# Patient Record
Sex: FEMALE | Race: WHITE | HISPANIC OR LATINO | Employment: FULL TIME | URBAN - METROPOLITAN AREA
[De-identification: names, ages, dates, MRNs, and addresses within clinical notes are randomized per-mention and may not be internally consistent; named-entity substitution may affect disease eponyms.]

---

## 2023-07-05 LAB
EXTERNAL HIV SCREEN: NORMAL
HCV AB SER-ACNC: NORMAL

## 2023-07-06 LAB — EXTERNAL HEPATITIS B SURFACE ANTIGEN: NORMAL

## 2023-08-10 ENCOUNTER — TELEPHONE (OUTPATIENT)
Dept: GYNECOLOGY | Facility: CLINIC | Age: 38
End: 2023-08-10

## 2023-08-11 NOTE — TELEPHONE ENCOUNTER
Patient returned records release. Faxed records release to [address] [place] [address] at Fax Number [phone]. Faxed records release to Dr. Erma Martinez office at f [phone].

## 2023-08-11 NOTE — TELEPHONE ENCOUNTER
Placed call to patient, offered here 8/17 in the [facility]) office. Patient declined as her next OB appointment wouldn't be until the week of 9/5. Patient has off the following days: 28th and 31st. Sept 1, 5. Prefers [facility]) office, either early morning or latest appointment in the day. Patient has had her OB care through Dr. Sarbjit Farris, [place]. Patient saw IVF - Dr. Michele Forte, [place]. I emailed patient a medical records release requesting she fill out 2 release forms, one for each provider, and email/fax them back to our office for us to submit. Will review records and look for opening to schedule patient. Email: [email or web address] - sent patient an email to sign up for [email or web address].

## 2023-08-14 ENCOUNTER — INITIAL PRENATAL (OUTPATIENT)
Dept: OBGYN CLINIC | Facility: CLINIC | Age: 38
End: 2023-08-14
Payer: COMMERCIAL

## 2023-08-14 VITALS
HEIGHT: 64 IN | SYSTOLIC BLOOD PRESSURE: 104 MMHG | DIASTOLIC BLOOD PRESSURE: 62 MMHG | BODY MASS INDEX: 26.98 KG/M2 | WEIGHT: 158 LBS

## 2023-08-14 DIAGNOSIS — Z34.92 SECOND TRIMESTER PREGNANCY: Primary | ICD-10-CM

## 2023-08-14 DIAGNOSIS — Z78.9 CONCEIVED BY IN VITRO FERTILIZATION: ICD-10-CM

## 2023-08-14 LAB — EXTERNAL RUBELLA IGG QUANTITATION: NORMAL

## 2023-08-14 PROCEDURE — PNV: Performed by: PHYSICIAN ASSISTANT

## 2023-08-14 PROCEDURE — 76815 OB US LIMITED FETUS(S): CPT | Performed by: PHYSICIAN ASSISTANT

## 2023-08-14 RX ORDER — LEVOTHYROXINE SODIUM 75 UG/1
CAPSULE ORAL
COMMUNITY

## 2023-08-14 NOTE — TELEPHONE ENCOUNTER
Placed call to patient as there was a cancellation this morning and patient was off today - patient is able to make the appointment. Scheduled patient for NOB for 16 weeks with Michael Ren.

## 2023-08-14 NOTE — PROGRESS NOTES
Pt as a NOB transfer to our office. She conceived via IVF and had initial OB care at a practice in [place]. She just moved to our area. She does not have any records with her today. She notes that she PE and labs done. Will plan to hold of on ordering all intial labs/cultures until results are obtained as that process has been initiated. Pt had biopsy prior to ET as well as NIPS. STD: h/o chlamydia previously  Drug use: denies   MRSA: denies   Varicella: thinks she had infection  Ob hx:  1  at term, VIP x 2 prior to that pregnancy. Will plan to establish with [facility] for 20 week anatomy scan. US in office today:   TAUS CRL c/w 17w3d. FL c/w 15w5d +  bpm. [place] IUP. + FM noted. Slip written for AFP.    Urine neg/neg

## 2023-08-14 NOTE — TELEPHONE ENCOUNTER
Dr Felicia Singh office at [phone], no answer, tried office twice. Placed call to [facility] [phone] - request marked urgent to send records. Left a message for [address] a return call with patients last PAP info and if prenatal labs were completed. Requested records be faxed ASAP. Faxed records request to [phone] for [facility] office (alternate fax number for outside [address] [place] [address]).

## 2023-08-15 ENCOUNTER — TELEPHONE (OUTPATIENT)
Facility: HOSPITAL | Age: 38
End: 2023-08-15

## 2023-08-15 LAB
# FETUSES US: 1
AFP ADJ MOM SERPL: 1.89
AFP INTERP SERPL-IMP: NORMAL
AFP SERPL-MCNC: 54.5 NG/ML
AGE: NORMAL
DONATED EGG PATIENT QL: NO
GA CLIN EST: 15.7 WEEKS
GA METHOD: NORMAL
HX OF NTD NARR: NO
HX OF TRISOMY 21 NARR: NO
IDDM PATIENT QL: NO
NEURAL TUBE DEFECT RISK FETUS: NORMAL %
SL AMB REPEAT SPECIMEN: NO

## 2023-08-15 NOTE — TELEPHONE ENCOUNTER
Called patient to schedule MFM appointment, based on referral issued to Maternal Fetal Medicine by [facility] office. Left voicemail requesting patient to call back and schedule appointment, with office number for return call [phone].

## 2023-08-18 ENCOUNTER — TELEPHONE (OUTPATIENT)
Facility: HOSPITAL | Age: 38
End: 2023-08-18

## 2023-08-18 NOTE — TELEPHONE ENCOUNTER
Called patient to schedule MFM appointment, based on referral issued to Maternal Fetal Medicine by [facility] office. Left voicemail requesting patient to call back and schedule appointment, with office number for return call [phone].

## 2023-09-12 NOTE — PROGRESS NOTES
OB/GYN  PN Visit  Sánchez Ramirez  [id number]  2023  12:20 PM  Dr. Kina Witt MD    S: 43 y.o. [id number] 20w1d here for PN visit. She denies contractions. She denies2 leakage of fluid and vaginal bleeding. She reports good fetal movement. She denies nausea, vomiting, edema, domestic violence, and smoking. She has headaches. Her pregnancy is complicated by IVF pregnancy, hypothyroidism, and AMA. She reports feeling pain in her ribs and upper abdomen especially when driving/ sitting. She states that it is most pronounced during her 2 hour commutes to work in [address] [place] [address] (they recently moved to [place]). She requests a note for work to maybe work from home (given). She also reports pelvic cramping after sitting for long periods of time. She also reports heartburn. O:  Pre-Jennifer Vitals    Flowsheet Row Most Recent Value   Prenatal Assessment    Fetal Heart Rate 154   Fundal Height (cm) 22 cm   Movement Present   Prenatal Vitals    Blood Pressure 110/72   Weight - Scale 73.9 kg (163 lb)   Urine Albumin/Glucose    Dilation/Effacement/Station    Vaginal Drainage    Draining Fluid No   Edema    LLE Edema None   RLE Edema None        Physical Exam  Vitals reviewed. Constitutional:       General: She is not in acute distress. Appearance: Normal appearance. She is well-developed. She is not ill-appearing, toxic-appearing or diaphoretic. Cardiovascular:      Rate and Rhythm: Normal rate. Pulmonary:      Effort: Pulmonary effort is normal. No respiratory distress. Abdominal:      General: There is no distension. Palpations: Abdomen is soft. There is no mass. Tenderness: There is no abdominal tenderness. There is no guarding or rebound. Genitourinary:     Comments: Gravid, nontender  Skin:     General: Skin is warm and dry. Neurological:      Mental Status: She is alert and oriented to person, place, and time.    Psychiatric:         Mood and Affect: Mood normal.         Behavior: Behavior normal. A/P:    Problem List        Unprioritized    19 weeks gestation of pregnancy    Current Assessment & Plan     - Continue PNV  - Labs: UTD  - Genetics: NIPT wnl; MSAPF wnl  - Ultrasounds: Level II Scheduled for tomorrow (9/15/23)  - Tdap:  Will offer after 27 weeks gestation  - Flu Shot: Will offer in season  - COVID: Vaccinated  - Delivery:  with epidural  - Contraception: Declined despite counseling  - Breastfeeding: Yes  - RTO in 4 weeks           Hypothyroid in pregnancy, antepartum, second trimester    Overview     Continue Levothyroxine  Referral to Endocrinology given per patient request         Multigravida of advanced maternal age in second trimester    Overview     Normal NIPT        Other Visit Diagnoses     Heartburn during pregnancy in second trimester    -  Primary            Future Appointments   Date Time Provider [facility] [address]   9/15/2023  8:00 AM  [facility]   10/10/2023  7:30 AM JOANNA Rosa [facility]   2023  8:30 AM JOANNA Rosa [facility]   2023  8:00 AM Unknown CHIVO Johnson [facility]   2023  7:30 AM JOANNA Peterson [facility]   2023 12:30 PM Jackson Juarez MD [facility]   2023  9:30 AM Carolyn Elaine, [address], [place]  2023  12:20 PM

## 2023-09-14 ENCOUNTER — ROUTINE PRENATAL (OUTPATIENT)
Dept: OBGYN CLINIC | Facility: CLINIC | Age: 38
End: 2023-09-14

## 2023-09-14 VITALS — WEIGHT: 163 LBS | DIASTOLIC BLOOD PRESSURE: 72 MMHG | SYSTOLIC BLOOD PRESSURE: 110 MMHG | BODY MASS INDEX: 27.98 KG/M2

## 2023-09-14 DIAGNOSIS — O26.892 HEARTBURN DURING PREGNANCY IN SECOND TRIMESTER: Primary | ICD-10-CM

## 2023-09-14 DIAGNOSIS — O99.282 HYPOTHYROID IN PREGNANCY, ANTEPARTUM, SECOND TRIMESTER: ICD-10-CM

## 2023-09-14 DIAGNOSIS — E03.9 HYPOTHYROID IN PREGNANCY, ANTEPARTUM, SECOND TRIMESTER: ICD-10-CM

## 2023-09-14 DIAGNOSIS — R12 HEARTBURN DURING PREGNANCY IN SECOND TRIMESTER: Primary | ICD-10-CM

## 2023-09-14 DIAGNOSIS — Z3A.19 19 WEEKS GESTATION OF PREGNANCY: ICD-10-CM

## 2023-09-14 PROBLEM — O09.522 MULTIGRAVIDA OF ADVANCED MATERNAL AGE IN SECOND TRIMESTER: Status: ACTIVE | Noted: 2023-09-14

## 2023-09-14 PROCEDURE — PNV: Performed by: OBSTETRICS & GYNECOLOGY

## 2023-09-14 RX ORDER — OMEPRAZOLE 20 MG/1
20 CAPSULE, DELAYED RELEASE ORAL DAILY
Qty: 30 CAPSULE | Refills: 3 | Status: SHIPPED | OUTPATIENT
Start: 2023-09-14

## 2023-09-14 NOTE — LETTER
September 14, 2023     Patient: Conrad Dsouza  YOB: 1985  Date of Visit: 9/14/2023      To Whom it May Concern:    Conrad Dsouza is under my professional care. Christian Garcia was seen in my office on 9/14/2023. Due to her pregnancy and related discomfort, attempts should be made to minimize commute/driving time. If you have any questions or concerns, please don't hesitate to call.          Sincerely,          Pato Turner MD

## 2023-09-14 NOTE — ASSESSMENT & PLAN NOTE
- Continue PNV  - Labs: UTD  - Genetics: NIPT wnl; MSAPF wnl  - Ultrasounds: Level II Scheduled for tomorrow (9/15/23)  - Tdap:  Will offer after 27 weeks gestation  - Flu Shot: Will offer in season  - COVID: Vaccinated  - Delivery:  with epidural  - Contraception: Declined despite counseling  - Breastfeeding: Yes  - RTO in 4 weeks

## 2023-09-15 ENCOUNTER — TELEPHONE (OUTPATIENT)
Dept: ENDOCRINOLOGY | Facility: CLINIC | Age: 38
End: 2023-09-15

## 2023-09-15 ENCOUNTER — ROUTINE PRENATAL (OUTPATIENT)
Dept: PERINATAL CARE | Facility: CLINIC | Age: 38
End: 2023-09-15
Payer: COMMERCIAL

## 2023-09-15 VITALS
HEIGHT: 64 IN | SYSTOLIC BLOOD PRESSURE: 98 MMHG | BODY MASS INDEX: 27.69 KG/M2 | HEART RATE: 70 BPM | WEIGHT: 162.2 LBS | DIASTOLIC BLOOD PRESSURE: 62 MMHG

## 2023-09-15 DIAGNOSIS — O09.522 MULTIGRAVIDA OF ADVANCED MATERNAL AGE IN SECOND TRIMESTER: ICD-10-CM

## 2023-09-15 DIAGNOSIS — D25.9 UTERINE FIBROID IN PREGNANCY: ICD-10-CM

## 2023-09-15 DIAGNOSIS — Z36.3 ENCOUNTER FOR ANTENATAL SCREENING FOR MALFORMATION: ICD-10-CM

## 2023-09-15 DIAGNOSIS — O99.282 HYPOTHYROID IN PREGNANCY, ANTEPARTUM, SECOND TRIMESTER: ICD-10-CM

## 2023-09-15 DIAGNOSIS — Z34.92 SECOND TRIMESTER PREGNANCY: ICD-10-CM

## 2023-09-15 DIAGNOSIS — Z36.86 ENCOUNTER FOR ANTENATAL SCREENING FOR CERVICAL LENGTH: ICD-10-CM

## 2023-09-15 DIAGNOSIS — Z3A.20 20 WEEKS GESTATION OF PREGNANCY: Primary | ICD-10-CM

## 2023-09-15 DIAGNOSIS — Z78.9 CONCEIVED BY IN VITRO FERTILIZATION: ICD-10-CM

## 2023-09-15 DIAGNOSIS — E03.9 HYPOTHYROID IN PREGNANCY, ANTEPARTUM, SECOND TRIMESTER: ICD-10-CM

## 2023-09-15 DIAGNOSIS — O34.10 UTERINE FIBROID IN PREGNANCY: ICD-10-CM

## 2023-09-15 PROCEDURE — 76817 TRANSVAGINAL US OBSTETRIC: CPT | Performed by: STUDENT IN AN ORGANIZED HEALTH CARE EDUCATION/TRAINING PROGRAM

## 2023-09-15 PROCEDURE — 76811 OB US DETAILED SNGL FETUS: CPT | Performed by: STUDENT IN AN ORGANIZED HEALTH CARE EDUCATION/TRAINING PROGRAM

## 2023-09-15 RX ORDER — ASPIRIN 81 MG/1
162 TABLET, CHEWABLE ORAL DAILY
Qty: 180 TABLET | Refills: 1 | Status: SHIPPED | OUTPATIENT
Start: 2023-09-15

## 2023-09-15 NOTE — LETTER
September 15, 2023     Cristofer Reyes PA-C  [facility] [place] [address]    Patient: Amadeo Severance   YOB: 1985   Date of Visit: 9/15/2023       Dear Dr. Laury Leary: Thank you for referring Amadeo Severance to me for evaluation. Below are my notes for this consultation. If you have questions, please do not hesitate to call me. I look forward to following your patient along with you. Sincerely,        Toña Rodas MD        CC: No Recipients    Toña Rodas MD  9/15/2023  9:09 AM  Sign when Signing Visit  [id number]  [facility]: Ms. Brayan Beyer was seen today for anatomic survey and cervical length screening ultrasound. See ultrasound report under "OB Procedures" tab. Physical Exam  Constitutional:       General: She is not in acute distress. Appearance: Normal appearance. HENT:      Head: Normocephalic and atraumatic. Eyes:      Extraocular Movements: Extraocular movements intact. Cardiovascular:      Rate and Rhythm: Normal rate. Pulmonary:      Effort: Pulmonary effort is normal. No respiratory distress. Skin:     Findings: No erythema or rash. Neurological:      Mental Status: She is alert and oriented to person, place, and time. Psychiatric:         Mood and Affect: Mood normal.         Behavior: Behavior normal.         Please don't hesitate to contact our office with any concerns or questions.   -Toña Rodas MD

## 2023-09-15 NOTE — LETTER
September 15, 2023     Patient: Rosa Elena Foss  YOB: 1985  Date of Visit: 9/15/2023      To Whom it May Concern:    Rosa Elena Foss is under my professional care. Raymundo greer was seen in my office on 9/15/2023. [place] may return to work on 9/18/23 . If you have any questions or concerns, please don't hesitate to call.          Sincerely,          Nikki Rich MD        CC: No Recipients

## 2023-09-15 NOTE — PROGRESS NOTES
[id number]  [facility]: Ms. Carla Banks was seen today for anatomic survey and cervical length screening ultrasound. See ultrasound report under "OB Procedures" tab. Physical Exam  Constitutional:       General: She is not in acute distress. Appearance: Normal appearance. HENT:      Head: Normocephalic and atraumatic. Eyes:      Extraocular Movements: Extraocular movements intact. Cardiovascular:      Rate and Rhythm: Normal rate. Pulmonary:      Effort: Pulmonary effort is normal. No respiratory distress. Skin:     Findings: No erythema or rash. Neurological:      Mental Status: She is alert and oriented to person, place, and time. Psychiatric:         Mood and Affect: Mood normal.         Behavior: Behavior normal.         Please don't hesitate to contact our office with any concerns or questions.   -Leela Huang MD

## 2023-09-15 NOTE — PROGRESS NOTES
Ultrasound Probe Disinfection    A transvaginal ultrasound was performed. Prior to use, disinfection was performed with High Level Disinfection Process (Behalfon). Probe serial number [id number]: [id number] was used.       Pepe Ruffin  09/15/23  8:19 AM

## 2023-09-28 DIAGNOSIS — R12 HEARTBURN DURING PREGNANCY IN SECOND TRIMESTER: ICD-10-CM

## 2023-09-28 DIAGNOSIS — O26.892 HEARTBURN DURING PREGNANCY IN SECOND TRIMESTER: ICD-10-CM

## 2023-09-28 RX ORDER — OMEPRAZOLE 20 MG/1
20 CAPSULE, DELAYED RELEASE ORAL DAILY
Qty: 90 CAPSULE | Refills: 2 | Status: SHIPPED | OUTPATIENT
Start: 2023-09-28

## 2023-09-30 NOTE — PROGRESS NOTES
Please refer to the [facility] ultrasound report in Ob Procedures for additional information regarding today's visit

## 2023-10-02 ENCOUNTER — ROUTINE PRENATAL (OUTPATIENT)
Dept: PERINATAL CARE | Facility: OTHER | Age: 38
End: 2023-10-02
Payer: COMMERCIAL

## 2023-10-02 VITALS
WEIGHT: 162.8 LBS | HEART RATE: 96 BPM | SYSTOLIC BLOOD PRESSURE: 90 MMHG | BODY MASS INDEX: 27.79 KG/M2 | DIASTOLIC BLOOD PRESSURE: 60 MMHG | HEIGHT: 64 IN

## 2023-10-02 DIAGNOSIS — Z3A.22 22 WEEKS GESTATION OF PREGNANCY: Primary | ICD-10-CM

## 2023-10-02 DIAGNOSIS — O09.812 PREGNANCY RESULTING FROM IN VITRO FERTILIZATION IN SECOND TRIMESTER: ICD-10-CM

## 2023-10-02 PROCEDURE — 76827 ECHO EXAM OF FETAL HEART: CPT | Performed by: OBSTETRICS & GYNECOLOGY

## 2023-10-02 PROCEDURE — 76825 ECHO EXAM OF FETAL HEART: CPT | Performed by: OBSTETRICS & GYNECOLOGY

## 2023-10-02 PROCEDURE — 93325 DOPPLER ECHO COLOR FLOW MAPG: CPT | Performed by: OBSTETRICS & GYNECOLOGY

## 2023-10-02 NOTE — LETTER
October 2, 2023     Kina Witt, [address] [facility]  [address] [facility]   [address] [place] [id number]    Patient: Sánchez Ramirez   YOB: 1985   Date of Visit: 10/2/2023       Dear Dr. Shahbaz Mclain: Thank you for referring Sánchez Ramirez to me for evaluation. Below are my notes for this consultation. If you have questions, please do not hesitate to call me. I look forward to following your patient along with you.          Sincerely,        Kelvin Lofton MD        CC: No Recipients    Kelvin Lofton MD  9/30/2023  6:32 PM  Sign when Signing Visit  Please refer to the [facility] ultrasound report in Ob Procedures for additional information regarding today's visit

## 2023-10-06 NOTE — PROGRESS NOTES
OB/GYN  PN Visit  Terence Montemayor  [id number]  10/10/2023  8:06 AM  Sarahe BenceJOANNA    S: 43 y.o. [id number] 23w6d here for PN visit. Pregnancy complicated by IVF pregnancy, hypothyroid, uterine fibroids, and AMA. Weekly NST to start at 32 weeks. OB complaints:  Denies c/o n/v/ha, no edema, no smoking, no DV. No vb/lof  No cramping/ctxns or signs of PTL. She feels fetal movement but believes his pattern has changed to being most active at night. She is questioning if this is 'okay'. She reports positive fetal movement today. She also reports heavy white vaginal discharge and itching. O:    Pre-Jennifer Vitals    Flowsheet Row Most Recent Value   Prenatal Assessment    Fetal Heart Rate 155   Fundal Height (cm) 24 cm   Movement Present   Prenatal Vitals    Blood Pressure 96/62   Weight - Scale 76.1 kg (167 lb 12.8 oz)   Urine Albumin/Glucose    Dilation/Effacement/Station    Vaginal Drainage    Draining Fluid No   Edema         Physical Exam  Genitourinary:     Exam position: Lithotomy position. Pubic Area: Rash present. Labia:         Right: No rash or tenderness. Left: No rash or tenderness. Urethra: No urethral pain. Vagina: Vaginal discharge present. Cervix: Normal.          Comments: Mild erythema noted on clitoral mendoza. +thick, white vaginal discharge adhered to vaginal walls, evident of candida infection. No active pooling of fluid. Gen: no acute distress, nonlabored breathing. OB exam completed: fundal height, +FHT. +visible active movement. Urine: -/-    A/P:  1. 23w6d GESTATION  2.Labor precautions reviewed  3. Third Tri labs given to be completed between 26-28 weeks. TSH/T4 ordered to evaluate thyroid disorder. 4. Monistat 7 day ordered for clinically evident candida infection. 5. Genital culture collected to r/o other infections. 6. Encouraged use of organic coconut oil to external vulva for irritation and itching.    7. Reviewed with patient that babies can start developing a 'normal' pattern of movement, but if ever questioning if movement is decreased, to call the office for evaluation. Active, visible movement noted at today's visit.        RTC in 4 weeks    JOANNA Estrada  10/10/2023  8:06 AM

## 2023-10-10 ENCOUNTER — ROUTINE PRENATAL (OUTPATIENT)
Dept: OBGYN CLINIC | Facility: CLINIC | Age: 38
End: 2023-10-10

## 2023-10-10 VITALS
BODY MASS INDEX: 28.65 KG/M2 | WEIGHT: 167.8 LBS | HEIGHT: 64 IN | DIASTOLIC BLOOD PRESSURE: 62 MMHG | SYSTOLIC BLOOD PRESSURE: 96 MMHG

## 2023-10-10 DIAGNOSIS — Z3A.23 23 WEEKS GESTATION OF PREGNANCY: Primary | ICD-10-CM

## 2023-10-10 DIAGNOSIS — N89.8 VAGINAL DISCHARGE: ICD-10-CM

## 2023-10-10 DIAGNOSIS — D25.9 UTERINE FIBROID IN PREGNANCY: ICD-10-CM

## 2023-10-10 DIAGNOSIS — O99.282 HYPOTHYROID IN PREGNANCY, ANTEPARTUM, SECOND TRIMESTER: ICD-10-CM

## 2023-10-10 DIAGNOSIS — O09.522 MULTIGRAVIDA OF ADVANCED MATERNAL AGE IN SECOND TRIMESTER: ICD-10-CM

## 2023-10-10 DIAGNOSIS — E03.9 HYPOTHYROID IN PREGNANCY, ANTEPARTUM, SECOND TRIMESTER: ICD-10-CM

## 2023-10-10 DIAGNOSIS — O34.10 UTERINE FIBROID IN PREGNANCY: ICD-10-CM

## 2023-10-10 DIAGNOSIS — Z78.9 CONCEIVED BY IN VITRO FERTILIZATION: ICD-10-CM

## 2023-10-10 PROCEDURE — PNV

## 2023-10-10 RX ORDER — LEVOTHYROXINE SODIUM 0.1 MG/1
TABLET ORAL
COMMUNITY
Start: 2023-10-03

## 2023-10-10 NOTE — PATIENT INSTRUCTIONS
Monistat 7 day  Use organic coconut oil for external vulva  Third trimester labs to be completed between 26-28 weeks.

## 2023-10-25 ENCOUNTER — PATIENT MESSAGE (OUTPATIENT)
Dept: OBGYN CLINIC | Facility: CLINIC | Age: 38
End: 2023-10-25

## 2023-10-27 ENCOUNTER — APPOINTMENT (OUTPATIENT)
Dept: LAB | Facility: CLINIC | Age: 38
End: 2023-10-27
Payer: COMMERCIAL

## 2023-10-27 DIAGNOSIS — Z34.92 SECOND TRIMESTER PREGNANCY: ICD-10-CM

## 2023-10-27 DIAGNOSIS — Z3A.23 23 WEEKS GESTATION OF PREGNANCY: ICD-10-CM

## 2023-10-27 LAB
BASOPHILS # BLD AUTO: 0.03 THOUSANDS/ÂΜL (ref 0–0.1)
BASOPHILS NFR BLD AUTO: 0 % (ref 0–1)
EOSINOPHIL # BLD AUTO: 0.13 THOUSAND/ÂΜL (ref 0–0.61)
EOSINOPHIL NFR BLD AUTO: 2 % (ref 0–6)
ERYTHROCYTE [DISTWIDTH] IN BLOOD BY AUTOMATED COUNT: 12.9 % (ref 11.6–15.1)
GLUCOSE 1H P 50 G GLC PO SERPL-MCNC: 112 MG/DL (ref 40–134)
HCT VFR BLD AUTO: 36.9 % (ref 34.8–46.1)
HGB BLD-MCNC: 12.2 G/DL (ref 11.5–15.4)
IMM GRANULOCYTES # BLD AUTO: 0.11 THOUSAND/UL (ref 0–0.2)
IMM GRANULOCYTES NFR BLD AUTO: 1 % (ref 0–2)
LYMPHOCYTES # BLD AUTO: 1.61 THOUSANDS/ÂΜL (ref 0.6–4.47)
LYMPHOCYTES NFR BLD AUTO: 21 % (ref 14–44)
MCH RBC QN AUTO: 29.5 PG (ref 26.8–34.3)
MCHC RBC AUTO-ENTMCNC: 33.1 G/DL (ref 31.4–37.4)
MCV RBC AUTO: 89 FL (ref 82–98)
MONOCYTES # BLD AUTO: 0.6 THOUSAND/ÂΜL (ref 0.17–1.22)
MONOCYTES NFR BLD AUTO: 8 % (ref 4–12)
NEUTROPHILS # BLD AUTO: 5.33 THOUSANDS/ÂΜL (ref 1.85–7.62)
NEUTS SEG NFR BLD AUTO: 68 % (ref 43–75)
NRBC BLD AUTO-RTO: 0 /100 WBCS
PLATELET # BLD AUTO: 227 THOUSANDS/UL (ref 149–390)
PMV BLD AUTO: 10.2 FL (ref 8.9–12.7)
RBC # BLD AUTO: 4.14 MILLION/UL (ref 3.81–5.12)
TREPONEMA PALLIDUM IGG+IGM AB [PRESENCE] IN SERUM OR PLASMA BY IMMUNOASSAY: NORMAL
TSH SERPL DL<=0.05 MIU/L-ACNC: 0.76 UIU/ML (ref 0.45–4.5)
WBC # BLD AUTO: 7.81 THOUSAND/UL (ref 4.31–10.16)

## 2023-10-27 PROCEDURE — 82105 ALPHA-FETOPROTEIN SERUM: CPT

## 2023-10-27 PROCEDURE — 86780 TREPONEMA PALLIDUM: CPT

## 2023-10-27 PROCEDURE — 84443 ASSAY THYROID STIM HORMONE: CPT

## 2023-10-27 PROCEDURE — 82950 GLUCOSE TEST: CPT

## 2023-10-27 PROCEDURE — 36415 COLL VENOUS BLD VENIPUNCTURE: CPT

## 2023-10-27 PROCEDURE — 85025 COMPLETE CBC W/AUTO DIFF WBC: CPT

## 2023-10-31 LAB
2ND TRIMESTER 4 SCREEN SERPL-IMP: NORMAL
AFP ADJ MOM SERPL: NORMAL
AFP INTERP AMN-IMP: NORMAL
AFP INTERP SERPL-IMP: NORMAL
AFP INTERP SERPL-IMP: NORMAL
AFP SERPL-MCNC: 282.9 NG/ML
AGE AT DELIVERY: 39.4 YR
GA METHOD: NORMAL
GA: 26.3 WEEKS
IDDM PATIENT QL: NO
MULTIPLE PREGNANCY: NO
NEURAL TUBE DEFECT RISK FETUS: NORMAL %

## 2023-11-06 NOTE — PROGRESS NOTES
OB/GYN  PN Visit  Ranjana Redman  [id number]  2023  8:53 AM  JOANNA Moore    S: 43 y.o. [id number] 28w1d here for PN visit. Pregnancy complicated by IVF pregnancy, hypothyroid, uterine fibroids, and AMA. Weekly NST to start at 32 weeks. OB complaints:  Denies c/o n/v/ha, no edema, no smoking, no DV. No vb/lof  No cramping/ctxns or signs of PTL. She reports bad pressure at top of uterus. Denies vaginal bleeding. +orin jones; occasional.     O:    Pre- Vitals      Flowsheet Row Most Recent Value   Prenatal Assessment    Fetal Heart Rate 145   Fundal Height (cm) 28 cm   Movement Present   Prenatal Vitals    Blood Pressure 98/68   Weight - Scale 77.1 kg (170 lb)   Urine Albumin/Glucose    Dilation/Effacement/Station    Vaginal Drainage    Edema               Gen: no acute distress, nonlabored breathing. OB exam completed: fundal height, +FHT. Urine: -/-    A/P:  #1. 28w1d GESTATION  Labor precautions reviewed  Fetal kick counts reviewed  Tdap: at next visit. Flu: declines  Labs UTD. Breast pump: yes, pump ordered today. Pediatrician: +  Contraception: undecided.        RTC in 2 weeks    JOANNA Moore  2023  8:53 AM

## 2023-11-09 ENCOUNTER — ROUTINE PRENATAL (OUTPATIENT)
Dept: OBGYN CLINIC | Facility: CLINIC | Age: 38
End: 2023-11-09

## 2023-11-09 VITALS — DIASTOLIC BLOOD PRESSURE: 68 MMHG | SYSTOLIC BLOOD PRESSURE: 98 MMHG | BODY MASS INDEX: 29.18 KG/M2 | WEIGHT: 170 LBS

## 2023-11-09 DIAGNOSIS — Z3A.28 28 WEEKS GESTATION OF PREGNANCY: Primary | ICD-10-CM

## 2023-11-09 DIAGNOSIS — O09.522 MULTIGRAVIDA OF ADVANCED MATERNAL AGE IN SECOND TRIMESTER: ICD-10-CM

## 2023-11-09 DIAGNOSIS — D25.9 UTERINE FIBROID IN PREGNANCY: ICD-10-CM

## 2023-11-09 DIAGNOSIS — Z78.9 CONCEIVED BY IN VITRO FERTILIZATION: ICD-10-CM

## 2023-11-09 DIAGNOSIS — O99.282 HYPOTHYROID IN PREGNANCY, ANTEPARTUM, SECOND TRIMESTER: ICD-10-CM

## 2023-11-09 DIAGNOSIS — O34.10 UTERINE FIBROID IN PREGNANCY: ICD-10-CM

## 2023-11-09 DIAGNOSIS — E03.9 HYPOTHYROID IN PREGNANCY, ANTEPARTUM, SECOND TRIMESTER: ICD-10-CM

## 2023-11-09 LAB
DME PARACHUTE DELIVERY DATE REQUESTED: NORMAL
DME PARACHUTE ITEM DESCRIPTION: NORMAL
DME PARACHUTE ORDER STATUS: NORMAL
DME PARACHUTE SUPPLIER NAME: NORMAL
DME PARACHUTE SUPPLIER PHONE: NORMAL

## 2023-11-09 PROCEDURE — PNV

## 2023-11-16 NOTE — PROGRESS NOTES
VISIT: Denies n/v/HA/cramping/vb/lof/edema/dv/smoking; urine neg/neg  (+) epigastric pain since August - comes and goes - can be sharp - uncomfortable when sitting - reviewed possibility of slow GI tract - food sitting in stomach and/or constipation; h/o of uterine fibroid; Encouraged trial of papaya tablets and/or prilosec. Bowels are regular enough - for the most part q day  Labs up to date; [facility] - follow up growth at 32 wks and start weekly APFS at 32 weeks; rec LDASA 162 mg daily until 36w  PNVs + DHA - tolerating daily  Good FM - r/meagan 10 kicks/2 hrs  Tdap - reviewed and encouraged - still considering - encouraged prior to 36 weeks and earlier in the third trimester;     Breast pump - ordered last visit; Peds - established; Contraception - undecided;  Yellow folder given and reviewed; Delivery Care Consent reviewed and signed   Encouraged hydration.  Reviewed signs and symptoms of labor and preE and when to call  Encouraged the flu vaccine and COVID booster in pregnancy - declines flu/COVID vaccine  RTO in 2 weeks for routine ob check or sooner if needed

## 2023-11-20 ENCOUNTER — ROUTINE PRENATAL (OUTPATIENT)
Dept: OBGYN CLINIC | Facility: CLINIC | Age: 38
End: 2023-11-20

## 2023-11-20 VITALS
DIASTOLIC BLOOD PRESSURE: 74 MMHG | BODY MASS INDEX: 29.53 KG/M2 | SYSTOLIC BLOOD PRESSURE: 102 MMHG | WEIGHT: 173 LBS | HEIGHT: 64 IN

## 2023-11-20 DIAGNOSIS — O99.283 HYPOTHYROID IN PREGNANCY, ANTEPARTUM, THIRD TRIMESTER: ICD-10-CM

## 2023-11-20 DIAGNOSIS — D25.9 UTERINE FIBROID IN PREGNANCY: ICD-10-CM

## 2023-11-20 DIAGNOSIS — Z3A.29 29 WEEKS GESTATION OF PREGNANCY: Primary | ICD-10-CM

## 2023-11-20 DIAGNOSIS — E03.9 HYPOTHYROID IN PREGNANCY, ANTEPARTUM, THIRD TRIMESTER: ICD-10-CM

## 2023-11-20 DIAGNOSIS — O34.10 UTERINE FIBROID IN PREGNANCY: ICD-10-CM

## 2023-11-20 DIAGNOSIS — O09.523 MULTIGRAVIDA OF ADVANCED MATERNAL AGE IN THIRD TRIMESTER: ICD-10-CM

## 2023-11-20 PROCEDURE — PNV: Performed by: PHYSICIAN ASSISTANT

## 2023-11-20 NOTE — PROGRESS NOTES
Please refer to the [facility] ultrasound report in Ob Procedures for additional information regarding today's visit

## 2023-11-22 ENCOUNTER — ULTRASOUND (OUTPATIENT)
Facility: HOSPITAL | Age: 38
End: 2023-11-22
Payer: COMMERCIAL

## 2023-11-22 VITALS
DIASTOLIC BLOOD PRESSURE: 54 MMHG | HEART RATE: 89 BPM | HEIGHT: 64 IN | OXYGEN SATURATION: 98 % | WEIGHT: 171.4 LBS | SYSTOLIC BLOOD PRESSURE: 102 MMHG | BODY MASS INDEX: 29.26 KG/M2

## 2023-11-22 DIAGNOSIS — Z3A.30 30 WEEKS GESTATION OF PREGNANCY: ICD-10-CM

## 2023-11-22 DIAGNOSIS — O09.813 PREGNANCY RESULTING FROM IN VITRO FERTILIZATION, THIRD TRIMESTER: Primary | ICD-10-CM

## 2023-11-22 DIAGNOSIS — E03.9 HYPOTHYROID IN PREGNANCY, ANTEPARTUM, THIRD TRIMESTER: ICD-10-CM

## 2023-11-22 DIAGNOSIS — O09.523 MULTIGRAVIDA OF ADVANCED MATERNAL AGE IN THIRD TRIMESTER: ICD-10-CM

## 2023-11-22 DIAGNOSIS — O99.283 HYPOTHYROID IN PREGNANCY, ANTEPARTUM, THIRD TRIMESTER: ICD-10-CM

## 2023-11-22 PROCEDURE — 76816 OB US FOLLOW-UP PER FETUS: CPT | Performed by: OBSTETRICS & GYNECOLOGY

## 2023-11-22 PROCEDURE — 99214 OFFICE O/P EST MOD 30 MIN: CPT | Performed by: OBSTETRICS & GYNECOLOGY

## 2023-11-22 NOTE — PATIENT INSTRUCTIONS
Kick Counts in Pregnancy   WHAT YOU NEED TO KNOW:   Kick counts measure how much your baby is moving in your womb. A kick from your baby can be felt as a twist, turn, swish, roll, or jab. It is common to feel your baby kicking at 26 to 28 weeks of pregnancy. You may feel your baby kick as early as 20 weeks of pregnancy. You may want to start counting at 28 weeks. DISCHARGE INSTRUCTIONS:   Contact your doctor immediately if:   You feel a change in the number of kicks or movements of your baby. You feel fewer than 10 kicks within 2 hours. You have questions or concerns about your baby's movements. Why measure kick counts:  Your baby's movement may provide information about your baby's health. He or she may move less, or not at all, if there are problems. Your baby may move less if he or she is not getting enough oxygen or nutrition from the placenta. Do not smoke while you are pregnant. Smoking decreases the amount of oxygen that gets to your baby. Talk to your healthcare provider if you need help to quit smoking. Tell your healthcare provider as soon as you feel a change in your baby's movements. When to measure kick counts:   Measure kick counts at the same time every day. Measure kick counts when your baby is awake and most active. Your baby may be most active in the evening. How to measure kick counts:  Check that your baby is awake before you measure kick counts. You can wake up your baby by lightly pushing on your belly, walking, or drinking something cold. Your healthcare provider may tell you different ways to measure kick counts. You may be told to do the following:  Use a chart or clock to keep track of the time you start and finish counting. Sit in a chair or lie on your left side. Place your hands on the largest part of your belly. Count until you reach 10 kicks. Write down how much time it takes to count 10 kicks. It may take 30 minutes to 2 hours to count 10 kicks. It should not take more than 2 hours to count 10 kicks. Follow up with your doctor as directed:  Write down your questions so you remember to ask them during your visits. © Copyright [facility] 2023 Information is for End User's use only and may not be sold, redistributed or otherwise used for commercial purposes. The above information is an  only. It is not intended as medical advice for individual conditions or treatments. Talk to your doctor, nurse or pharmacist before following any medical regimen to see if it is safe and effective for you.

## 2023-11-22 NOTE — LETTER
November 22, 2023     Becky Fernandes, [address]    Patient: Kulwant Pereira   YOB: 1985   Date of Visit: 11/22/2023       Dear Dr. Quang Gustafson: Thank you for referring Kulwant Pereira to me for evaluation. Below are my notes for this consultation. If you have questions, please do not hesitate to call me. I look forward to following your patient along with you.          Sincerely,        Henrique Eller MD        CC: No Recipients    Henrique Eller MD  11/22/2023  1:20 PM  Sign when Signing Visit  Please refer to the [facility] ultrasound report in Ob Procedures for additional information regarding today's visit

## 2023-12-07 NOTE — PROGRESS NOTES
OB/GYN  PN Visit  Mckenna Blackwood  [id number]  2023  2:00 PM  JOANNA uBstos    S: 43 y.o. [id number] 32w2d here for PN visit. Pregnancy complicated by IVF pregnancy, hypothyroid, uterine fibroids, and AMA. OB complaints:  Denies c/o n/v/ha, no edema, no smoking, no DV. No vb/lof  No cramping/ctxns or signs of PTL. She reports tender upper abdominal pain (top, middle below xiphoid). She denies that it heartburn. She reports that at her growth scan, they scanned the upper abdomen and did NOT see anything GYN related including placenta/fetal/uterine concerns (fibroids). She feels that the pain is musculoskeletal.     O:    Pre-Jennifer Vitals      Flowsheet Row Most Recent Value   Prenatal Assessment    Fetal Heart Rate 145   Fundal Height (cm) 32 cm   Movement Present   Prenatal Vitals    Blood Pressure 108/52   Weight - Scale 78.6 kg (173 lb 3.2 oz)   Urine Albumin/Glucose    Dilation/Effacement/Station    Vaginal Drainage    Edema               Gen: no acute distress, nonlabored breathing. OB exam completed: fundal height, +FHT. Urine: -/-    A/P:  #1. 32w2d GESTATION  Labor precautions reviewed  Fetal kick counts reviewed  Tdap: declines  Flu: declines  Labs UTD. Yellow packet and delivery consent done at 30 week visit. Breast pump: yes, pump ordered today. Pediatrician: +  Contraception: undecided. Advised to try belly band and/or pregnancy tape to support belly. Encouraged patient to reach out to PCP to review upper abdominal pain, if US is required. If pain is severe, to report ED.      RTC in 2 weeks    JOANNA Bustos  2023  2:00 PM

## 2023-12-08 ENCOUNTER — ROUTINE PRENATAL (OUTPATIENT)
Dept: OBGYN CLINIC | Facility: CLINIC | Age: 38
End: 2023-12-08

## 2023-12-08 VITALS
BODY MASS INDEX: 29.57 KG/M2 | DIASTOLIC BLOOD PRESSURE: 52 MMHG | SYSTOLIC BLOOD PRESSURE: 108 MMHG | WEIGHT: 173.2 LBS | HEIGHT: 64 IN

## 2023-12-08 DIAGNOSIS — O34.10 UTERINE FIBROID IN PREGNANCY: ICD-10-CM

## 2023-12-08 DIAGNOSIS — O99.283 HYPOTHYROID IN PREGNANCY, ANTEPARTUM, THIRD TRIMESTER: ICD-10-CM

## 2023-12-08 DIAGNOSIS — D25.9 UTERINE FIBROID IN PREGNANCY: ICD-10-CM

## 2023-12-08 DIAGNOSIS — Z3A.32 32 WEEKS GESTATION OF PREGNANCY: Primary | ICD-10-CM

## 2023-12-08 DIAGNOSIS — Z78.9 CONCEIVED BY IN VITRO FERTILIZATION: ICD-10-CM

## 2023-12-08 DIAGNOSIS — O09.523 MULTIGRAVIDA OF ADVANCED MATERNAL AGE IN THIRD TRIMESTER: ICD-10-CM

## 2023-12-08 DIAGNOSIS — E03.9 HYPOTHYROID IN PREGNANCY, ANTEPARTUM, THIRD TRIMESTER: ICD-10-CM

## 2023-12-08 PROCEDURE — PNV

## 2023-12-14 LAB
DME PARACHUTE DELIVERY DATE ACTUAL: NORMAL
DME PARACHUTE DELIVERY DATE REQUESTED: NORMAL
DME PARACHUTE ITEM DESCRIPTION: NORMAL
DME PARACHUTE ORDER STATUS: NORMAL
DME PARACHUTE SUPPLIER NAME: NORMAL
DME PARACHUTE SUPPLIER PHONE: NORMAL

## 2023-12-18 ENCOUNTER — ROUTINE PRENATAL (OUTPATIENT)
Dept: OBGYN CLINIC | Facility: CLINIC | Age: 38
End: 2023-12-18

## 2023-12-18 VITALS
HEIGHT: 64 IN | WEIGHT: 176 LBS | SYSTOLIC BLOOD PRESSURE: 104 MMHG | DIASTOLIC BLOOD PRESSURE: 70 MMHG | BODY MASS INDEX: 30.05 KG/M2

## 2023-12-18 DIAGNOSIS — O99.283 HYPOTHYROID IN PREGNANCY, ANTEPARTUM, THIRD TRIMESTER: ICD-10-CM

## 2023-12-18 DIAGNOSIS — Z3A.33 33 WEEKS GESTATION OF PREGNANCY: ICD-10-CM

## 2023-12-18 DIAGNOSIS — O09.523 MULTIGRAVIDA OF ADVANCED MATERNAL AGE IN THIRD TRIMESTER: Primary | ICD-10-CM

## 2023-12-18 DIAGNOSIS — E03.9 HYPOTHYROID IN PREGNANCY, ANTEPARTUM, THIRD TRIMESTER: ICD-10-CM

## 2023-12-18 PROCEDURE — PNV: Performed by: STUDENT IN AN ORGANIZED HEALTH CARE EDUCATION/TRAINING PROGRAM

## 2023-12-18 NOTE — PATIENT INSTRUCTIONS
Pregnancy at 31 to 34 Weeks   AMBULATORY CARE:   Changes happening with your body:  You may continue to have symptoms such as shortness of breath, heartburn, contractions, or swelling of your ankles and feet. You may be gaining about 1 pound a week now.  Seek care immediately if:   You develop a severe headache that does not go away.    You have new or increased vision changes, such as blurred or spotted vision.    You have new or increased swelling in your face or hands.    You have vaginal spotting or bleeding.    Your water broke or you feel warm water gushing or trickling from your vagina.    Call your obstetrician if:   You have more than 5 contractions in 1 hour.    You notice any changes in your baby's movements.    You have abdominal cramps, pressure, or tightening.    You have a change in vaginal discharge.    You have chills or a fever.    You have vaginal itching, burning, or pain.    You have yellow, green, white, or foul-smelling vaginal discharge.    You have pain or burning when you urinate, less urine than usual, or pink or bloody urine.    You have questions or concerns about your condition or care.    How to care for yourself at this stage of your pregnancy:       Eat a variety of healthy foods.  Healthy foods include fruits, vegetables, whole-grain breads, low-fat dairy foods, beans, lean meats, and fish. Drink liquids as directed. Ask how much liquid to drink each day and which liquids are best for you. Limit caffeine to less than 200 milligrams each day. Limit your intake of fish to 2 servings each week. Choose fish low in mercury such as canned light tuna, shrimp, salmon, cod, or tilapia. Do not  eat fish high in mercury such as swordfish, tilefish, chris mackerel, and shark.         Manage heartburn  by eating 4 or 5 small meals each day instead of large meals. Avoid spicy food.    Manage swelling  by lying down and putting your feet up.         Take prenatal vitamins as directed.  Your need  for certain vitamins and minerals, such as folic acid, increases during pregnancy. Prenatal vitamins provide some of the extra vitamins and minerals you need. Prenatal vitamins may also help to decrease the risk of certain birth defects.         Talk to your healthcare provider about exercise.  Moderate exercise can help you stay fit. Your healthcare provider will help you plan an exercise program that is safe for you during pregnancy.         Do not smoke.  Smoking increases your risk of a miscarriage and other health problems during your pregnancy. Smoking can cause your baby to be born too early or weigh less at birth. Ask your healthcare provider for information if you need help quitting.    Do not drink alcohol.  Alcohol passes from your body to your baby through the placenta. It can affect your baby's brain development and cause fetal alcohol syndrome (FAS). FAS is a group of conditions that causes mental, behavior, and growth problems.    Talk to your healthcare provider before you take any medicines.  Many medicines may harm your baby if you take them when you are pregnant. Do not take any medicines, vitamins, herbs, or supplements without first talking to your healthcare provider. Never use illegal or street drugs (such as marijuana or cocaine) while you are pregnant.  Safety tips during pregnancy:   Avoid hot tubs and saunas.  Do not use a hot tub or sauna while you are pregnant, especially during your first trimester. Hot tubs and saunas may raise your baby's temperature and increase the risk of birth defects.    Avoid toxoplasmosis.  This is an infection caused by eating raw meat or being around infected cat feces. It can cause birth defects, miscarriages, and other problems. Wash your hands after you touch raw meat. Make sure any meat is well-cooked before you eat it. Avoid raw eggs and unpasteurized milk. Use gloves or ask someone else to clean your cat's litter box while you are pregnant.        Changes happening with your baby:  By 34 weeks, your baby may weigh more than 5 pounds. Your baby will be about 12 ½ inches long from the top of the head to the rump (baby's bottom). Your baby is gaining about ½ pound a week. Your baby's eyes open and close now. Your baby's kicks and movements are more forceful at this time.  What you need to know about prenatal care:  Your healthcare provider will check your blood pressure and weight. You may also need the following:  A urine test  may also be done to check for sugar and protein. These can be signs of gestational diabetes or infection. Protein in your urine may also be a sign of preeclampsia. Preeclampsia is a condition that can develop during week 20 or later of your pregnancy. It causes high blood pressure, and it can cause problems with your kidneys and other organs.    A gestational diabetes screen  may be done. Your healthcare provider may order either a 1-step or 2-step oral glucose tolerance test (OGTT).     1-step OGTT:  Your blood sugar level will be tested after you have not eaten for 8 hours (fasting). You will then be given a glucose drink. Your level will be tested again 1 hour and 2 hours after you finish the drink.    2-step OGTT:  You do not have to fast for the first part of the test. You will have the glucose drink at any time of day. Your blood sugar level will be checked 1 hour later. If your blood sugar is higher than a certain level, another test will be ordered. You will fast and your blood sugar level will be tested. You will have the glucose drink. Your blood will be tested again 1 hour, 2 hours, and 3 hours after you finish the glucose drink.    A Tdap vaccine  may be recommended by your healthcare provider.    Fundal height  is a measurement of your uterus to check your baby's growth. This number is usually the same as the number of weeks that you have been pregnant. Your healthcare provider may also check your baby's  position.    Your baby's heart rate  will be checked.    Follow up with your obstetrician as directed:  Write down your questions so you remember to ask them during your visits.  © Copyright [employer] 2023 Information is for End User's use only and may not be sold, redistributed or otherwise used for commercial purposes.  The above information is an  only. It is not intended as medical advice for individual conditions or treatments. Talk to your doctor, nurse or pharmacist before following any medical regimen to see if it is safe and effective for you.

## 2023-12-18 NOTE — PROGRESS NOTES
1 is a 38-year-old -0-2-1 at 32 weeks and 5 days presenting for routine prenatal care-she does report some irregular cramping and abdominal tightening.  Unsure about fluid loss feels as though she may be losing urine but is unclear.  Denies any current leakage of fluid, abnormal discharge or dysuria.  Denies any vaginal bleeding.  Reports good fetal movement.  Pregnancy is complicated by advanced maternal age, thyroid disease and IVF pregnancy.  On speculum exam today the vulva, vagina and cervix appear grossly normal, cervix appears closed.  There is minimal white discharge and no pooling noted.  Nitrazine negative.  Dry slide negative.  Cervical exam was closed/thick/high.  Reassurance was given today and we reviewed  labor precautions and to call with any questions or concerns.  Return to office in 2 weeks will need-NSTs weekly with visits.

## 2024-01-02 NOTE — PROGRESS NOTES
VISIT 38 yr old  at 36w0d: (+) n; (+) cramping - persistent upper abdominal pain/cramping for most of pregnancy; now with severe sharp vaginal pain/cramping; (+) edema - feet - comes and goes; Denies v/HA/vb/lof/dv/smoking; urine neg/neg  Labs up to date; PNC - follow-up growth/position - ;   PNVs + DHA - tolerating daily  Good FM - r/meagan 10 kicks/2 hrs  Tdap declined in the past; Reviewed RSV vaccine indicated in pregnancy between [id number]-[id number] - declines today    NST - baseline 140; good accels, no decels; good variability; toco- mild uterine irritability; MEGAN 15.51;   Cervix - outer cervix about 1 cm/70 and inner cervix closed; could not feel baby head but confirmed vertex on ultrasound    Breast pump - ordered prior; Peds - established; Contraception - undecided;  Yellow folder/Delivery Care Consent reviewed and signed at prior visit  Encouraged hydration. Reviewed signs and symptoms of labor and preE and when to call  Encouraged hand washing/infection prevention; Declines the flu vaccine and COVID booster in pregnancy  GBS done  RTO in 1 weeks for routine ob check/LT or sooner if needed

## 2024-01-03 ENCOUNTER — ROUTINE PRENATAL (OUTPATIENT)
Dept: OBGYN CLINIC | Facility: CLINIC | Age: 39
End: 2024-01-03
Payer: COMMERCIAL

## 2024-01-03 VITALS
BODY MASS INDEX: 30.32 KG/M2 | WEIGHT: 177.6 LBS | SYSTOLIC BLOOD PRESSURE: 98 MMHG | HEIGHT: 64 IN | HEART RATE: 80 BPM | DIASTOLIC BLOOD PRESSURE: 64 MMHG

## 2024-01-03 DIAGNOSIS — Z3A.36 36 WEEKS GESTATION OF PREGNANCY: ICD-10-CM

## 2024-01-03 DIAGNOSIS — Z34.83 ENCOUNTER FOR SUPERVISION OF NORMAL PREGNANCY IN MULTIGRAVIDA IN THIRD TRIMESTER: Primary | ICD-10-CM

## 2024-01-03 PROCEDURE — 59025 FETAL NON-STRESS TEST: CPT | Performed by: PHYSICIAN ASSISTANT

## 2024-01-03 PROCEDURE — PNV: Performed by: PHYSICIAN ASSISTANT

## 2024-01-04 NOTE — PROGRESS NOTES
Please refer to the [facility] ultrasound report in Ob Procedures for additional information regarding today's visit

## 2024-01-05 ENCOUNTER — ULTRASOUND (OUTPATIENT)
Facility: HOSPITAL | Age: 39
End: 2024-01-05
Payer: COMMERCIAL

## 2024-01-05 VITALS
BODY MASS INDEX: 30.22 KG/M2 | DIASTOLIC BLOOD PRESSURE: 62 MMHG | WEIGHT: 177 LBS | SYSTOLIC BLOOD PRESSURE: 106 MMHG | HEIGHT: 64 IN | HEART RATE: 84 BPM

## 2024-01-05 DIAGNOSIS — O99.283 HYPOTHYROIDISM IN PREGNANCY, THIRD TRIMESTER: ICD-10-CM

## 2024-01-05 DIAGNOSIS — O09.813 PREGNANCY RESULTING FROM IN VITRO FERTILIZATION, THIRD TRIMESTER: Primary | ICD-10-CM

## 2024-01-05 DIAGNOSIS — E03.9 HYPOTHYROIDISM IN PREGNANCY, THIRD TRIMESTER: ICD-10-CM

## 2024-01-05 DIAGNOSIS — Z3A.36 36 WEEKS GESTATION OF PREGNANCY: ICD-10-CM

## 2024-01-05 PROCEDURE — 76816 OB US FOLLOW-UP PER FETUS: CPT | Performed by: OBSTETRICS & GYNECOLOGY

## 2024-01-05 PROCEDURE — 99213 OFFICE O/P EST LOW 20 MIN: CPT | Performed by: OBSTETRICS & GYNECOLOGY

## 2024-01-05 NOTE — LETTER
January 5, 2024     Cony Duran MD  [address] [facility] [id number]    Patient: Chikis Amador   YOB: 1985   Date of Visit: 1/5/2024       Dear Dr. Duran:    Thank you for referring Chikis Amador to me for evaluation. Below are my notes for this consultation.    If you have questions, please do not hesitate to call me. I look forward to following your patient along with you.         Sincerely,        Mathew Riddle MD        CC: No Recipients    Mathew Riddle MD  1/5/2024  9:39 AM  Sign when Signing Visit  Please refer to the [facility] ultrasound report in Ob Procedures for additional information regarding today's visit

## 2024-01-05 NOTE — LETTER
JAMES sleeve cover sheet    Patient name: Chikis Amador  : 1985  MRN: [id number]    MYRNA: Estimated Date of Delivery: 24    Obstetrician: [facility]    Reason(s) for testing:  IVF  __________________________________________      Testing frequency:    ___ 2x/wk  ___ 1x/wk  ___ Dopplers  ___ BPP?      Last growth scan: __________________________________________

## 2024-01-07 LAB — EXTERNAL GROUP B STREP ANTIGEN: NEGATIVE

## 2024-01-11 ENCOUNTER — TELEPHONE (OUTPATIENT)
Dept: OBGYN CLINIC | Facility: CLINIC | Age: 39
End: 2024-01-11

## 2024-01-11 ENCOUNTER — ULTRASOUND (OUTPATIENT)
Dept: OBGYN CLINIC | Facility: CLINIC | Age: 39
End: 2024-01-11
Payer: COMMERCIAL

## 2024-01-11 VITALS
HEART RATE: 78 BPM | BODY MASS INDEX: 30.9 KG/M2 | WEIGHT: 180 LBS | DIASTOLIC BLOOD PRESSURE: 68 MMHG | SYSTOLIC BLOOD PRESSURE: 108 MMHG

## 2024-01-11 DIAGNOSIS — Z34.93 PRENATAL CARE, THIRD TRIMESTER: ICD-10-CM

## 2024-01-11 DIAGNOSIS — E03.9 HYPOTHYROID IN PREGNANCY, ANTEPARTUM, THIRD TRIMESTER: ICD-10-CM

## 2024-01-11 DIAGNOSIS — O09.523 MULTIGRAVIDA OF ADVANCED MATERNAL AGE IN THIRD TRIMESTER: ICD-10-CM

## 2024-01-11 DIAGNOSIS — O99.283 HYPOTHYROID IN PREGNANCY, ANTEPARTUM, THIRD TRIMESTER: ICD-10-CM

## 2024-01-11 DIAGNOSIS — Z3A.37 37 WEEKS GESTATION OF PREGNANCY: Primary | ICD-10-CM

## 2024-01-11 PROCEDURE — 59025 FETAL NON-STRESS TEST: CPT | Performed by: STUDENT IN AN ORGANIZED HEALTH CARE EDUCATION/TRAINING PROGRAM

## 2024-01-11 PROCEDURE — PNV: Performed by: STUDENT IN AN ORGANIZED HEALTH CARE EDUCATION/TRAINING PROGRAM

## 2024-01-11 NOTE — PROGRESS NOTES
Chikis is a 38 y.o.  37w1d. Reports ++FM, no LOF, VB, or regular contractions.     Vitals:    24 0800   BP: 108/68   Pulse: 78   +FHTs  TAUS: vertex, MEGAN 11  NST reactive   SVE 0.5/50/-3    A/P:  IVF pregnancy  Weekly NST starting 36 wks  Declines Flu vac/COVID booster  GBS neg (1/3/24)    Rh status A POS    Discussed term labor precautions  Return to office in 1 weeks

## 2024-01-16 ENCOUNTER — NURSE TRIAGE (OUTPATIENT)
Dept: OTHER | Facility: OTHER | Age: 39
End: 2024-01-16

## 2024-01-17 ENCOUNTER — HOSPITAL ENCOUNTER (INPATIENT)
Facility: HOSPITAL | Age: 39
LOS: 4 days | Discharge: HOME | End: 2024-01-21
Attending: STUDENT IN AN ORGANIZED HEALTH CARE EDUCATION/TRAINING PROGRAM | Admitting: STUDENT IN AN ORGANIZED HEALTH CARE EDUCATION/TRAINING PROGRAM
Payer: COMMERCIAL

## 2024-01-17 ENCOUNTER — ANESTHESIA (INPATIENT)
Dept: ANESTHESIOLOGY | Facility: HOSPITAL | Age: 39
End: 2024-01-17
Payer: COMMERCIAL

## 2024-01-17 ENCOUNTER — ANESTHESIA EVENT (INPATIENT)
Dept: ANESTHESIOLOGY | Facility: HOSPITAL | Age: 39
End: 2024-01-17
Payer: COMMERCIAL

## 2024-01-17 DIAGNOSIS — Z98.891 S/P PRIMARY LOW TRANSVERSE C-SECTION: Primary | ICD-10-CM

## 2024-01-17 DIAGNOSIS — O61.9: ICD-10-CM

## 2024-01-17 PROBLEM — Z3A.38 38 WEEKS GESTATION OF PREGNANCY: Status: ACTIVE | Noted: 2023-09-14

## 2024-01-17 LAB
ABO GROUP BLD: NORMAL
ABO GROUP BLD: NORMAL
BLD GP AB SCN SERPL QL: NEGATIVE
ERYTHROCYTE [DISTWIDTH] IN BLOOD BY AUTOMATED COUNT: 13.4 % (ref 11.6–15.1)
HCT VFR BLD AUTO: 37.1 % (ref 34.8–46.1)
HGB BLD-MCNC: 12.3 G/DL (ref 11.5–15.4)
HOLD SPECIMEN: NORMAL
MCH RBC QN AUTO: 29.4 PG (ref 26.8–34.3)
MCHC RBC AUTO-ENTMCNC: 33.2 G/DL (ref 31.4–37.4)
MCV RBC AUTO: 89 FL (ref 82–98)
PLATELET # BLD AUTO: 196 THOUSANDS/UL (ref 149–390)
PMV BLD AUTO: 10.4 FL (ref 8.9–12.7)
RBC # BLD AUTO: 4.18 MILLION/UL (ref 3.81–5.12)
RH BLD: POSITIVE
RH BLD: POSITIVE
SPECIMEN EXPIRATION DATE: NORMAL
TREPONEMA PALLIDUM IGG+IGM AB [PRESENCE] IN SERUM OR PLASMA BY IMMUNOASSAY: NORMAL
WBC # BLD AUTO: 10.79 THOUSAND/UL (ref 4.31–10.16)

## 2024-01-17 PROCEDURE — 85027 COMPLETE CBC AUTOMATED: CPT

## 2024-01-17 PROCEDURE — C9113 INJ PANTOPRAZOLE SODIUM, VIA: HCPCS | Performed by: OBSTETRICS & GYNECOLOGY

## 2024-01-17 PROCEDURE — NC001 PR NO CHARGE: Performed by: STUDENT IN AN ORGANIZED HEALTH CARE EDUCATION/TRAINING PROGRAM

## 2024-01-17 PROCEDURE — 10H07YZ INSERTION OF OTHER DEVICE INTO PRODUCTS OF CONCEPTION, VIA NATURAL OR ARTIFICIAL OPENING: ICD-10-PCS | Performed by: OBSTETRICS & GYNECOLOGY

## 2024-01-17 PROCEDURE — 4A1HXCZ MONITORING OF PRODUCTS OF CONCEPTION, CARDIAC RATE, EXTERNAL APPROACH: ICD-10-PCS | Performed by: STUDENT IN AN ORGANIZED HEALTH CARE EDUCATION/TRAINING PROGRAM

## 2024-01-17 PROCEDURE — 86780 TREPONEMA PALLIDUM: CPT

## 2024-01-17 PROCEDURE — 99214 OFFICE O/P EST MOD 30 MIN: CPT

## 2024-01-17 PROCEDURE — 86901 BLOOD TYPING SEROLOGIC RH(D): CPT

## 2024-01-17 PROCEDURE — 86900 BLOOD TYPING SEROLOGIC ABO: CPT

## 2024-01-17 PROCEDURE — 86850 RBC ANTIBODY SCREEN: CPT

## 2024-01-17 RX ORDER — OXYTOCIN/RINGER'S LACTATE 30/500 ML
1-30 PLASTIC BAG, INJECTION (ML) INTRAVENOUS
Status: DISCONTINUED | OUTPATIENT
Start: 2024-01-17 | End: 2024-01-18

## 2024-01-17 RX ORDER — CALCIUM CARBONATE 500 MG/1
500 TABLET, CHEWABLE ORAL DAILY PRN
Status: DISCONTINUED | OUTPATIENT
Start: 2024-01-17 | End: 2024-01-18

## 2024-01-17 RX ORDER — SODIUM CHLORIDE, SODIUM LACTATE, POTASSIUM CHLORIDE, CALCIUM CHLORIDE 600; 310; 30; 20 MG/100ML; MG/100ML; MG/100ML; MG/100ML
125 INJECTION, SOLUTION INTRAVENOUS CONTINUOUS
Status: DISCONTINUED | OUTPATIENT
Start: 2024-01-17 | End: 2024-01-21 | Stop reason: HOSPADM

## 2024-01-17 RX ORDER — OXYTOCIN/RINGER'S LACTATE 30/500 ML
1-30 PLASTIC BAG, INJECTION (ML) INTRAVENOUS
Status: DISCONTINUED | OUTPATIENT
Start: 2024-01-17 | End: 2024-01-17

## 2024-01-17 RX ORDER — FAMOTIDINE 20 MG/1
20 TABLET, FILM COATED ORAL 2 TIMES DAILY
Status: DISCONTINUED | OUTPATIENT
Start: 2024-01-17 | End: 2024-01-17

## 2024-01-17 RX ORDER — ONDANSETRON 2 MG/ML
4 INJECTION INTRAMUSCULAR; INTRAVENOUS EVERY 6 HOURS PRN
Status: DISCONTINUED | OUTPATIENT
Start: 2024-01-17 | End: 2024-01-18

## 2024-01-17 RX ORDER — PANTOPRAZOLE SODIUM 40 MG/10ML
40 INJECTION, POWDER, LYOPHILIZED, FOR SOLUTION INTRAVENOUS ONCE
Status: COMPLETED | OUTPATIENT
Start: 2024-01-17 | End: 2024-01-17

## 2024-01-17 RX ORDER — BUPIVACAINE HYDROCHLORIDE 2.5 MG/ML
30 INJECTION, SOLUTION EPIDURAL; INFILTRATION; INTRACAUDAL ONCE AS NEEDED
Status: DISCONTINUED | OUTPATIENT
Start: 2024-01-17 | End: 2024-01-18

## 2024-01-17 RX ORDER — LEVOTHYROXINE SODIUM 0.1 MG/1
100 TABLET ORAL
Status: DISCONTINUED | OUTPATIENT
Start: 2024-01-17 | End: 2024-01-19

## 2024-01-17 RX ADMIN — ONDANSETRON 4 MG: 2 INJECTION INTRAMUSCULAR; INTRAVENOUS at 08:57

## 2024-01-17 RX ADMIN — PANTOPRAZOLE SODIUM 40 MG: 40 INJECTION, POWDER, FOR SOLUTION INTRAVENOUS at 22:29

## 2024-01-17 RX ADMIN — ROPIVACAINE HYDROCHLORIDE: 2 INJECTION, SOLUTION EPIDURAL; INFILTRATION at 15:48

## 2024-01-17 RX ADMIN — SODIUM CHLORIDE, SODIUM LACTATE, POTASSIUM CHLORIDE, AND CALCIUM CHLORIDE 125 ML/HR: .6; .31; .03; .02 INJECTION, SOLUTION INTRAVENOUS at 02:12

## 2024-01-17 RX ADMIN — SODIUM CHLORIDE, SODIUM LACTATE, POTASSIUM CHLORIDE, AND CALCIUM CHLORIDE 125 ML/HR: .6; .31; .03; .02 INJECTION, SOLUTION INTRAVENOUS at 20:54

## 2024-01-17 RX ADMIN — Medication 2 MILLI-UNITS/MIN: at 06:30

## 2024-01-17 RX ADMIN — LEVOTHYROXINE SODIUM 100 MCG: 100 TABLET ORAL at 06:18

## 2024-01-17 RX ADMIN — ROPIVACAINE HYDROCHLORIDE: 2 INJECTION, SOLUTION EPIDURAL; INFILTRATION at 23:11

## 2024-01-17 RX ADMIN — SODIUM CHLORIDE, SODIUM LACTATE, POTASSIUM CHLORIDE, AND CALCIUM CHLORIDE 125 ML/HR: .6; .31; .03; .02 INJECTION, SOLUTION INTRAVENOUS at 14:27

## 2024-01-17 RX ADMIN — FAMOTIDINE 20 MG: 20 TABLET, FILM COATED ORAL at 03:00

## 2024-01-17 RX ADMIN — SODIUM CHLORIDE, SODIUM LACTATE, POTASSIUM CHLORIDE, AND CALCIUM CHLORIDE 125 ML/HR: .6; .31; .03; .02 INJECTION, SOLUTION INTRAVENOUS at 08:22

## 2024-01-17 RX ADMIN — Medication 6 MILLI-UNITS/MIN: at 19:12

## 2024-01-17 RX ADMIN — ROPIVACAINE HYDROCHLORIDE: 2 INJECTION, SOLUTION EPIDURAL; INFILTRATION at 08:50

## 2024-01-17 NOTE — ANESTHESIA PREPROCEDURE EVALUATION
Procedure:  LABOR ANALGESIA    Relevant Problems   ENDO   (+) Hypothyroid in pregnancy, antepartum, third trimester      GYN   (+) 38 weeks gestation of pregnancy   (+) Multigravida of advanced maternal age in third trimester        Physical Exam    Airway    Mallampati score: II  TM Distance: >3 FB  Neck ROM: full     Dental   No notable dental hx     Cardiovascular      Pulmonary      Other Findings  post-pubertal.      Anesthesia Plan  ASA Score- 2     Anesthesia Type- epidural with ASA Monitors.         Additional Monitors:     Airway Plan:     Comment: Patient examined, history reviewed.  Labor epidural explained, risks and benefits discussed.  Consent has been signed.Patient examined, history reviewed.  Labor epidural explained, risks and benefits discussed.  Consent has been signed..       Plan Factors-Exercise tolerance (METS): >4 METS.    Chart reviewed.   Existing labs reviewed. Patient summary reviewed.                  Induction-     Postoperative Plan-     Informed Consent- Anesthetic plan and risks discussed with patient.  I personally reviewed this patient with the CRNA. Discussed and agreed on the Anesthesia Plan with the CRNA..

## 2024-01-17 NOTE — OB LABOR/OXYTOCIN SAFETY PROGRESS
"Labor Progress Note - Chikis Amador 38 y.o. female MRN: [id number]    Unit/Bed#: -01 Encounter: [id number]       Contraction Frequency (minutes): 4-5  Contraction Intensity: Mild/Moderate  Uterine Activity Characteristics: Regular  Cervical Dilation: 1        Cervical Effacement: 80  Fetal Station: -3  Baseline Rate (FHR): 140 bpm  Fetal Heart Rate (FHT): 140 BPM  FHR Category: I               Vital Signs:   Vitals:    01/17/24 0056   BP: 107/66   Pulse: 82   Resp:    SpO2: 98%       Notes/comments:   Patient is comfortable. Cervical exam is unchanged. Discussed starting pitocin for augmentation. Patient would like to wait to see if she goes into labor \"naturally.\" After shared decision making, we decided we will recheck and revisit the idea of pitocin at the next check. She does not want to rush delivery. We discussed the risk of infection. Plan to reassess in 2 hours or sooner if clinically indicated. Dr. Duran notified.        Destinee Morgan MD 1/17/2024 3:23 AM      "

## 2024-01-17 NOTE — OB LABOR/OXYTOCIN SAFETY PROGRESS
Oxytocin Safety Progress Check Note - Chikis Amador 38 y.o. female MRN: [id number]    Unit/Bed#: -01 Encounter: [id number]    Dose (filippo-units/min) Oxytocin: 22 filippo-units/min  Contraction Frequency (minutes): 5  Contraction Intensity: Moderate  Uterine Activity Characteristics: Irregular  Cervical Dilation: 2        Cervical Effacement: 80  Fetal Station: -2  Baseline Rate (FHR): 130 bpm  Fetal Heart Rate (FHT): 129 BPM  FHR Category: Cat 1               Vital Signs:   Vitals:    01/17/24 1149   BP: 110/65   Pulse: 75   Resp:    Temp:    SpO2:        Notes/comments:   Chikis is comfortable with her epidural. Cervical exam is minimally changed. She may have toast at this time and then will continue pitocin titration. No forebag appreciated. Will continue to monitor and reassess as indicated.        Mariama Gibson MD 1/17/2024 12:02 PM

## 2024-01-17 NOTE — ASSESSMENT & PLAN NOTE
Routine postpartum care  Encourage ambulation  Encourage familial bonding  Lactation support as needed  Pain: Motrin/Tylenol around the clock, oxycodone if needed  Postpartum Contraceptive plan: declines  Pre-delivery Hgb 12.3 --> 12.4  Johnson catheter: passed VT  DVT Ppx: ambulation, SCDs, Lovenox 40mg

## 2024-01-17 NOTE — OB LABOR/OXYTOCIN SAFETY PROGRESS
Oxytocin Safety Progress Check Note - Chikis Amador 38 y.o. female MRN: [id number]    Unit/Bed#: -01 Encounter: [id number]    Dose (filippo-units/min) Oxytocin: 0 filippo-units/min (per )  Contraction Frequency (minutes): 4-5  Contraction Intensity: Moderate  Uterine Activity Characteristics: Irregular  Cervical Dilation: 4        Cervical Effacement: 90  Fetal Station: -2  Baseline Rate (FHR): 155 bpm  Fetal Heart Rate (FHT): 129 BPM  FHR Category: Cat 1               Vital Signs:   Vitals:    01/17/24 1817   BP: 113/65   Pulse: 90   Resp:    Temp:    SpO2:        Notes/comments:   Chikis is feeling abdominal pressure despite her epidural. Cervical exam is minimally changed. She has been on 30mU/min of pitocin since 1500. Therefore, the decision was made for a pit break of 30 minutes. Patient may have a light snack and then will resume pitocin at 1845 @ 16mU/min. Will plan to place IUPC with next check for further contraction monitoring.     Mariama Gibson MD 1/17/2024 6:33 PM

## 2024-01-17 NOTE — ASSESSMENT & PLAN NOTE
1.  myometrium fibroid located in the anterior corpus region, measuring  1.9 x 0.9 x 1.7cm with a volume of 1.5cc. Color doppler flow was not  increased. The appearance was hypoechoic.     2.  myometrium fibroid located in the right posterior corpus region,  measuring 4.6 x 4.0 x 4.9cm with a volume of 47.2cc. Color doppler flow  was not increased. The appearance was heterogeneous.

## 2024-01-17 NOTE — ANESTHESIA PROCEDURE NOTES
Epidural Block    Patient location during procedure: OB/L&D  Start time: 1/17/2024 8:35 AM  Reason for block: procedure for pain  Staffing  Performed by: Jensen Alamo MD  Authorized by: Jensen Alamo MD    Preanesthetic Checklist  Completed: patient identified, IV checked, site marked, risks and benefits discussed, surgical consent, monitors and equipment checked, pre-op evaluation and timeout performed  Epidural  Patient position: sitting  Prep: ChloraPrep  Sedation Level: no sedation  Patient monitoring: frequent blood pressure checks, continuous pulse oximetry, heart rate and cardiac monitor  Approach: midline  Location: lumbar, L3-4  Injection technique: LOWELL air  Needle  Needle type: Tuohy   Needle gauge: 18 G  Needle insertion depth: 6 cm  Catheter type: multi-orifice  Catheter size: 20 G  Catheter at skin depth: 11 cm  Catheter securement method: stabilization device and clear occlusive dressing  Test dose: negative  Assessment  Sensory level: T10  Number of attempts: 1negative aspiration for CSF, negative aspiration for heme and no paresthesia on injection  patient tolerated the procedure well with no immediate complications

## 2024-01-17 NOTE — PLAN OF CARE

## 2024-01-17 NOTE — TELEPHONE ENCOUNTER
"Reason for Disposition  • [1] Leakage of fluid from vagina AND [2] leakage started < 4 hours ago    Answer Assessment - Initial Assessment Questions  1. ONSET: \"When did the symptoms begin?\"         Patient's  reports that he was giving pt a perineal massage and she started to leaking thin fluid and has not stopped, also small amount of faint blood    2. CONTRACTIONS: \"Describe the contractions that you are having.\" (e.g., duration, frequency, regularity, severity)      \"Tightening\"    3. MYRNA: \"What date are you expecting to deliver?\"      24    4. PARITY: \"Have you had a baby before?\" If Yes, ask: \"How long did the labor last?\"          5. FETAL MOVEMENT: \"Has the baby's movement decreased or changed significantly from normal?\"      Hasn't felt fetal movement for the past hour    6. OTHER SYMPTOMS: \"Do you have any other symptoms?\" (e.g., leaking fluid from vagina, vaginal bleeding, fever, hand/facial swelling)      Heartburn    37w6d.    Patient is already on way to hospital.    Contacted on-call provider and [facility] L&D charge nurse via  to inform them of above information.    Protocols used: Pregnancy - Labor-ADULT-AH    "

## 2024-01-17 NOTE — H&P
"H&P - Obstetrics   Chikis Amador 38 y.o. female MRN: [id number]  Unit/Bed#: LD TRIAGE 3- Encounter: [id number]    Assessment and Plan:  38 y.o.  at 38w0d who is being admitted for SROM vs PROM. By issue:  * 38 weeks gestation of pregnancy  Assessment & Plan  Cephalic by ultrasound. Clinical EFW by [facility]: 7 lbs  GBS negative status  Plan for expectant management, augmentation if necessary  Analgesia and/or epidural at patient request  Follow up CBC, Syphilis screening, blood type & antibody screen  Method of contraception: declines    Uterine fibroid in pregnancy  Assessment & Plan  1.  myometrium fibroid located in the anterior corpus region, measuring  1.9 x 0.9 x 1.7cm with a volume of 1.5cc. Color doppler flow was not  increased. The appearance was hypoechoic.     2.  myometrium fibroid located in the right posterior corpus region,  measuring 4.6 x 4.0 x 4.9cm with a volume of 47.2cc. Color doppler flow  was not increased. The appearance was heterogeneous.       Multigravida of advanced maternal age in third trimester  Assessment & Plan  NIPS wnl     Hypothyroid in pregnancy, antepartum, third trimester  Assessment & Plan  Levothyroxine 100 mcg in the am        Plan of care discussed with Dr. Duran.    This patient will be an INPATIENT and I certify the anticipated length of stay is >2 Midnights.    History of Present Illness     Chief Complaint: \"I broke my water \"    History of Present Illness:  Chikis Amador is a 38 y.o.  with an MYRNA of 2024, by Embryo Transfer at 38w0d weeks gestation who presents with spontaneous rupture of membranes at 1115 pm during perineal massage. Reports contractions are now starting to increase. Hx of prior vaginal delivery. Denies chest pain, SOB, lower extremity pain or swelling. Reports taking levothyroxine for hypothyroidism. Denies any other health issues or pregnancy complications.     Contractions: present  Loss of fluid: present  Vaginal " bleeding: denies  Fetal movement: present    ROS otherwise negative.     Obstetrician: CFW    Pregnancy complications:   Hypothyroidism  AMA  IVF pregnancy    OB History    Para Term  AB Living   4 1 1   2 1   SAB IAB Ectopic Multiple Live Births           1      # Outcome Date GA Lbr Reggie/2nd Weight Sex Delivery Anes PTL Lv   4 Current            3 Term 11 40w0d   F Vag-Spont   ROMEO   2 AB 2005           1 AB                Baby complications/comments: EFW  81% at 37 weeks     Review of Systems  12-point ROS negative unless stated in HPI.    Historical Information   Past Medical History:   Diagnosis Date    Hypothyroidism      Past Surgical History:   Procedure Laterality Date    BREAST IMPLANT       Social History   Social History     Substance and Sexual Activity   Alcohol Use Not Currently    Comment: not currently due to preg     Social History     Substance and Sexual Activity   Drug Use Never     Social History     Tobacco Use   Smoking Status Never   Smokeless Tobacco Never     Family History:   Family History   Problem Relation Age of Onset    Autism Brother     Down syndrome Brother        Meds/Allergies      Medications Prior to Admission   Medication    aspirin 81 mg chewable tablet    levothyroxine 100 mcg tablet    omeprazole (PriLOSEC) 20 mg delayed release capsule    Prenatal Vit-Fe Fumarate-FA (PRENATAL PO)        Allergies   Allergen Reactions    Pollen Extract Sneezing     Seasonal        Objective:  Vitals: Blood pressure 130/56, pulse 77, resp. rate 17, SpO2 100%.There is no height or weight on file to calculate BMI.    Physical Exam  GEN: alert and oriented x 3, no apparent distress, appears well  CARDIAC: regular rate and rhythm  PULMONARY: normal effort, clear to auscultation bilaterally  ABDOMEN: gravid, soft, no tenderness  GENITOURINARY: normal external female genitalia, nitrazine positive, pooling, SVE 1/80/-3   EXTREMITIES: nontender, no edema  FETAL  ASSESSMENT:  Fetal heart rate: 140/moderate variability/15x15 accelerations/No decelerations; Category I  [place]: q2-5 min    Prenatal Labs:   Blood type: A+  Antibody screen: negative  HIV: non-reactive  Hepatitis B surface antigen: non-reactive  Hepatitis C antibody: non-reactive  Syphilis screening: negative  Gonorrhea/Chlamydia: negative/negative  Rubella: Immune  Varicella: Immune  Urine culture: No growth  1 hour GTT: 112 mg/dL  Group B strep: negative  Last Hemoglobin: 12.2 g/dL  Last Platelet count: 227 K    Invasive Devices       None                     Destinee Morgan MD   PGY-I, OBGYN  1/17/2024  12:35 AM

## 2024-01-17 NOTE — OB LABOR/OXYTOCIN SAFETY PROGRESS
Labor Progress Note - Chikis Amador 38 y.o. female MRN: [id number]    Unit/Bed#: -01 Encounter: [id number]       Contraction Frequency (minutes): 10  Contraction Intensity: Mild  Uterine Activity Characteristics: Coupling  Cervical Dilation: 1        Cervical Effacement: 60  Fetal Station: -3  Baseline Rate (FHR): 125 bpm  Fetal Heart Rate (FHT): 144 BPM  FHR Category: 1               Vital Signs:   Vitals:    01/17/24 0418   BP: 106/59   Pulse: 68   Resp:    Temp: 97.5 °F (36.4 °C)   SpO2:        Notes/comments:   Pt is resting comfortably. SVE as above. FHT cat 1.  Discussed with patient that given no cervical change since rupture of membranes, recommend starting augmentation at this point to decrease risk of infection due to prolonged rupture of membranes.  Patient agreeable to starting Pitocin at this time.    Dr. Duran aware    Ignacio Ryan MD 1/17/2024 6:19 AM

## 2024-01-17 NOTE — OB LABOR/OXYTOCIN SAFETY PROGRESS
Oxytocin Safety Progress Check Note - Chikis Amador 38 y.o. female MRN: [id number]    Unit/Bed#: -01 Encounter: [id number]    Dose (filippo-units/min) Oxytocin: 12 filippo-units/min  Contraction Frequency (minutes): 2-6  Contraction Intensity: Moderate  Uterine Activity Characteristics: Irregular  Cervical Dilation: 1        Cervical Effacement: 60  Fetal Station: -3  Baseline Rate (FHR): 125 bpm  Fetal Heart Rate (FHT): 129 BPM  FHR Category: I               Vital Signs:   Vitals:    01/17/24 0932   BP: 101/62   Pulse: 58   Resp:    Temp:    SpO2:        Notes/comments:   FHR category I, SVE deferred at this check. Continue pitocin titration and plan to recheck in 1-2 hours.    Mariam Howard MD 1/17/2024 9:47 AM

## 2024-01-17 NOTE — TELEPHONE ENCOUNTER
"Regardin wks pregnant / possible labor  ----- Message from Magda Romano sent at 2024 11:18 PM EST -----  \"My wife is 38 weeks pregnant and we aren't sure\"    "

## 2024-01-17 NOTE — OB LABOR/OXYTOCIN SAFETY PROGRESS
Oxytocin Safety Progress Check Note - Chikis Amador 38 y.o. female MRN: [id number]    Unit/Bed#: -01 Encounter: [id number]    Dose (filippo-units/min) Oxytocin: 30 filippo-units/min  Contraction Frequency (minutes): 3  Contraction Intensity: Moderate  Uterine Activity Characteristics: Irregular  Cervical Dilation: 3-4        Cervical Effacement: 90  Fetal Station: -2  Baseline Rate (FHR): 140 bpm  Fetal Heart Rate (FHT): 129 BPM  FHR Category: II               Vital Signs:   Vitals:    01/17/24 1533   BP: 120/77   Pulse: 60   Resp:    Temp:    SpO2:        Notes/comments:   Patient feeling comfortable with epidural, occasional late decelerations noted on FHR. SVE now 3.5/90/-2. Plan to reposition and fluid bolus as needed.    Mariam Howard MD 1/17/2024 3:45 PM

## 2024-01-18 ENCOUNTER — ANESTHESIA EVENT (INPATIENT)
Dept: LABOR AND DELIVERY | Facility: HOSPITAL | Age: 39
End: 2024-01-18
Payer: COMMERCIAL

## 2024-01-18 ENCOUNTER — ANESTHESIA (INPATIENT)
Dept: LABOR AND DELIVERY | Facility: HOSPITAL | Age: 39
End: 2024-01-18
Payer: COMMERCIAL

## 2024-01-18 PROBLEM — Z98.891 S/P PRIMARY LOW TRANSVERSE C-SECTION: Status: ACTIVE | Noted: 2023-09-14

## 2024-01-18 LAB
BASE EXCESS BLDCOV CALC-SCNC: -1.8 MMOL/L (ref 1–9)
HCO3 BLDCOV-SCNC: 23 MMOL/L (ref 12.2–28.6)
OXYHGB MFR BLDCOV: 65.2 %
PCO2 BLDCOV: 39.6 MM HG (ref 27–43)
PH BLDCOV: 7.38 [PH] (ref 7.19–7.49)
PO2 BLDCOV: 25.5 MM HG (ref 15–45)
SAO2 % BLDCOV: 16.2 ML/DL

## 2024-01-18 PROCEDURE — 82805 BLOOD GASES W/O2 SATURATION: CPT | Performed by: STUDENT IN AN ORGANIZED HEALTH CARE EDUCATION/TRAINING PROGRAM

## 2024-01-18 PROCEDURE — 59510 CESAREAN DELIVERY: CPT | Performed by: STUDENT IN AN ORGANIZED HEALTH CARE EDUCATION/TRAINING PROGRAM

## 2024-01-18 PROCEDURE — NC001 PR NO CHARGE: Performed by: OBSTETRICS & GYNECOLOGY

## 2024-01-18 RX ORDER — FENTANYL CITRATE 50 UG/ML
INJECTION, SOLUTION INTRAMUSCULAR; INTRAVENOUS AS NEEDED
Status: DISCONTINUED | OUTPATIENT
Start: 2024-01-18 | End: 2024-01-18 | Stop reason: HOSPADM

## 2024-01-18 RX ORDER — OXYTOCIN/RINGER'S LACTATE 30/500 ML
62.5 PLASTIC BAG, INJECTION (ML) INTRAVENOUS ONCE
Status: COMPLETED | OUTPATIENT
Start: 2024-01-18 | End: 2024-01-18

## 2024-01-18 RX ORDER — SENNOSIDES 8.6 MG
1 TABLET ORAL DAILY
Status: DISCONTINUED | OUTPATIENT
Start: 2024-01-19 | End: 2024-01-21 | Stop reason: HOSPADM

## 2024-01-18 RX ORDER — LIDOCAINE HCL/EPINEPHRINE/PF 2%-1:200K
VIAL (ML) INJECTION AS NEEDED
Status: DISCONTINUED | OUTPATIENT
Start: 2024-01-18 | End: 2024-01-18

## 2024-01-18 RX ORDER — TRANEXAMIC ACID 10 MG/ML
1000 INJECTION, SOLUTION INTRAVENOUS ONCE
Status: DISCONTINUED | OUTPATIENT
Start: 2024-01-18 | End: 2024-01-21 | Stop reason: HOSPADM

## 2024-01-18 RX ORDER — CALCIUM CARBONATE 500 MG/1
1000 TABLET, CHEWABLE ORAL ONCE
Status: COMPLETED | OUTPATIENT
Start: 2024-01-18 | End: 2024-01-18

## 2024-01-18 RX ORDER — ONDANSETRON 2 MG/ML
4 INJECTION INTRAMUSCULAR; INTRAVENOUS EVERY 6 HOURS PRN
Status: DISPENSED | OUTPATIENT
Start: 2024-01-18 | End: 2024-01-19

## 2024-01-18 RX ORDER — ACETAMINOPHEN 325 MG/1
650 TABLET ORAL EVERY 6 HOURS SCHEDULED
Status: DISCONTINUED | OUTPATIENT
Start: 2024-01-19 | End: 2024-01-21 | Stop reason: HOSPADM

## 2024-01-18 RX ORDER — METOCLOPRAMIDE HYDROCHLORIDE 5 MG/ML
10 INJECTION INTRAMUSCULAR; INTRAVENOUS ONCE AS NEEDED
Status: DISCONTINUED | OUTPATIENT
Start: 2024-01-18 | End: 2024-01-18

## 2024-01-18 RX ORDER — KETOROLAC TROMETHAMINE 30 MG/ML
INJECTION, SOLUTION INTRAMUSCULAR; INTRAVENOUS AS NEEDED
Status: DISCONTINUED | OUTPATIENT
Start: 2024-01-18 | End: 2024-01-18

## 2024-01-18 RX ORDER — ONDANSETRON 2 MG/ML
4 INJECTION INTRAMUSCULAR; INTRAVENOUS ONCE AS NEEDED
Status: DISCONTINUED | OUTPATIENT
Start: 2024-01-18 | End: 2024-01-18

## 2024-01-18 RX ORDER — HYDROMORPHONE HCL/PF 1 MG/ML
0.5 SYRINGE (ML) INJECTION EVERY 2 HOUR PRN
Status: DISCONTINUED | OUTPATIENT
Start: 2024-01-18 | End: 2024-01-21 | Stop reason: HOSPADM

## 2024-01-18 RX ORDER — DIPHENHYDRAMINE HYDROCHLORIDE 50 MG/ML
25 INJECTION INTRAMUSCULAR; INTRAVENOUS ONCE
Status: DISCONTINUED | OUTPATIENT
Start: 2024-01-18 | End: 2024-01-18

## 2024-01-18 RX ORDER — OXYTOCIN/RINGER'S LACTATE 30/500 ML
1-30 PLASTIC BAG, INJECTION (ML) INTRAVENOUS
Status: DISCONTINUED | OUTPATIENT
Start: 2024-01-18 | End: 2024-01-18

## 2024-01-18 RX ORDER — FENTANYL CITRATE 50 UG/ML
INJECTION, SOLUTION INTRAMUSCULAR; INTRAVENOUS
Status: COMPLETED
Start: 2024-01-18 | End: 2024-01-18

## 2024-01-18 RX ORDER — KETOROLAC TROMETHAMINE 30 MG/ML
30 INJECTION, SOLUTION INTRAMUSCULAR; INTRAVENOUS EVERY 6 HOURS
Status: COMPLETED | OUTPATIENT
Start: 2024-01-18 | End: 2024-01-19

## 2024-01-18 RX ORDER — TRANEXAMIC ACID 10 MG/ML
INJECTION, SOLUTION INTRAVENOUS AS NEEDED
Status: DISCONTINUED | OUTPATIENT
Start: 2024-01-18 | End: 2024-01-18

## 2024-01-18 RX ORDER — BUPIVACAINE HYDROCHLORIDE 2.5 MG/ML
INJECTION, SOLUTION EPIDURAL; INFILTRATION; INTRACAUDAL AS NEEDED
Status: DISCONTINUED | OUTPATIENT
Start: 2024-01-18 | End: 2024-01-18 | Stop reason: HOSPADM

## 2024-01-18 RX ORDER — NALBUPHINE HYDROCHLORIDE 10 MG/ML
5 INJECTION, SOLUTION INTRAMUSCULAR; INTRAVENOUS; SUBCUTANEOUS
Status: ACTIVE | OUTPATIENT
Start: 2024-01-18 | End: 2024-01-19

## 2024-01-18 RX ORDER — NALOXONE HYDROCHLORIDE 0.4 MG/ML
0.1 INJECTION, SOLUTION INTRAMUSCULAR; INTRAVENOUS; SUBCUTANEOUS
Status: ACTIVE | OUTPATIENT
Start: 2024-01-18 | End: 2024-01-19

## 2024-01-18 RX ORDER — DIPHENHYDRAMINE HYDROCHLORIDE 50 MG/ML
50 INJECTION INTRAMUSCULAR; INTRAVENOUS ONCE
Status: COMPLETED | OUTPATIENT
Start: 2024-01-18 | End: 2024-01-18

## 2024-01-18 RX ORDER — HYDROMORPHONE HCL/PF 1 MG/ML
0.5 SYRINGE (ML) INJECTION
Status: DISCONTINUED | OUTPATIENT
Start: 2024-01-18 | End: 2024-01-18

## 2024-01-18 RX ORDER — OXYCODONE HYDROCHLORIDE 5 MG/1
5 TABLET ORAL EVERY 4 HOURS PRN
Status: DISCONTINUED | OUTPATIENT
Start: 2024-01-19 | End: 2024-01-18

## 2024-01-18 RX ORDER — OXYCODONE HYDROCHLORIDE 5 MG/1
5 TABLET ORAL EVERY 4 HOURS PRN
Status: DISCONTINUED | OUTPATIENT
Start: 2024-01-18 | End: 2024-01-21 | Stop reason: HOSPADM

## 2024-01-18 RX ORDER — BUPIVACAINE HYDROCHLORIDE 5 MG/ML
INJECTION, SOLUTION EPIDURAL; INTRACAUDAL AS NEEDED
Status: DISCONTINUED | OUTPATIENT
Start: 2024-01-18 | End: 2024-01-18 | Stop reason: HOSPADM

## 2024-01-18 RX ORDER — METHYLERGONOVINE MALEATE 0.2 MG/ML
INJECTION INTRAVENOUS AS NEEDED
Status: DISCONTINUED | OUTPATIENT
Start: 2024-01-18 | End: 2024-01-18

## 2024-01-18 RX ORDER — ACETAMINOPHEN 325 MG/1
650 TABLET ORAL EVERY 6 HOURS SCHEDULED
Status: DISPENSED | OUTPATIENT
Start: 2024-01-18 | End: 2024-01-19

## 2024-01-18 RX ORDER — MORPHINE SULFATE 1 MG/ML
INJECTION, SOLUTION EPIDURAL; INTRATHECAL; INTRAVENOUS AS NEEDED
Status: DISCONTINUED | OUTPATIENT
Start: 2024-01-18 | End: 2024-01-18

## 2024-01-18 RX ORDER — DEXAMETHASONE SODIUM PHOSPHATE 10 MG/ML
INJECTION, SOLUTION INTRAMUSCULAR; INTRAVENOUS AS NEEDED
Status: DISCONTINUED | OUTPATIENT
Start: 2024-01-18 | End: 2024-01-18

## 2024-01-18 RX ORDER — FENTANYL CITRATE/PF 50 MCG/ML
50 SYRINGE (ML) INJECTION
Status: DISCONTINUED | OUTPATIENT
Start: 2024-01-18 | End: 2024-01-18

## 2024-01-18 RX ORDER — BENZOCAINE/MENTHOL 6 MG-10 MG
1 LOZENGE MUCOUS MEMBRANE DAILY PRN
Status: DISCONTINUED | OUTPATIENT
Start: 2024-01-18 | End: 2024-01-21 | Stop reason: HOSPADM

## 2024-01-18 RX ORDER — METHYLERGONOVINE MALEATE 0.2 MG/ML
0.2 INJECTION INTRAVENOUS ONCE
Status: DISCONTINUED | OUTPATIENT
Start: 2024-01-18 | End: 2024-01-21 | Stop reason: HOSPADM

## 2024-01-18 RX ORDER — DIPHENHYDRAMINE HCL 25 MG
25 TABLET ORAL EVERY 6 HOURS PRN
Status: DISCONTINUED | OUTPATIENT
Start: 2024-01-18 | End: 2024-01-21 | Stop reason: HOSPADM

## 2024-01-18 RX ORDER — PROMETHAZINE HYDROCHLORIDE 25 MG/ML
12.5 INJECTION, SOLUTION INTRAMUSCULAR; INTRAVENOUS ONCE AS NEEDED
Status: DISCONTINUED | OUTPATIENT
Start: 2024-01-18 | End: 2024-01-18

## 2024-01-18 RX ORDER — ENOXAPARIN SODIUM 100 MG/ML
40 INJECTION SUBCUTANEOUS DAILY
Status: DISCONTINUED | OUTPATIENT
Start: 2024-01-19 | End: 2024-01-21 | Stop reason: HOSPADM

## 2024-01-18 RX ORDER — OXYTOCIN/RINGER'S LACTATE 30/500 ML
PLASTIC BAG, INJECTION (ML) INTRAVENOUS CONTINUOUS PRN
Status: DISCONTINUED | OUTPATIENT
Start: 2024-01-18 | End: 2024-01-18

## 2024-01-18 RX ORDER — CALCIUM CARBONATE 500 MG/1
1000 TABLET, CHEWABLE ORAL DAILY PRN
Status: DISCONTINUED | OUTPATIENT
Start: 2024-01-18 | End: 2024-01-21 | Stop reason: HOSPADM

## 2024-01-18 RX ORDER — SIMETHICONE 80 MG
80 TABLET,CHEWABLE ORAL 4 TIMES DAILY PRN
Status: DISCONTINUED | OUTPATIENT
Start: 2024-01-18 | End: 2024-01-21 | Stop reason: HOSPADM

## 2024-01-18 RX ORDER — CEFAZOLIN SODIUM 2 G/50ML
2000 SOLUTION INTRAVENOUS ONCE
Status: COMPLETED | OUTPATIENT
Start: 2024-01-18 | End: 2024-01-18

## 2024-01-18 RX ORDER — ONDANSETRON 2 MG/ML
INJECTION INTRAMUSCULAR; INTRAVENOUS AS NEEDED
Status: DISCONTINUED | OUTPATIENT
Start: 2024-01-18 | End: 2024-01-18

## 2024-01-18 RX ORDER — IBUPROFEN 600 MG/1
600 TABLET ORAL EVERY 6 HOURS
Status: DISCONTINUED | OUTPATIENT
Start: 2024-01-19 | End: 2024-01-21 | Stop reason: HOSPADM

## 2024-01-18 RX ORDER — PROMETHAZINE HYDROCHLORIDE 25 MG/ML
12.5 INJECTION, SOLUTION INTRAMUSCULAR; INTRAVENOUS EVERY 6 HOURS PRN
Status: DISCONTINUED | OUTPATIENT
Start: 2024-01-18 | End: 2024-01-21 | Stop reason: HOSPADM

## 2024-01-18 RX ORDER — METOCLOPRAMIDE HYDROCHLORIDE 5 MG/ML
5 INJECTION INTRAMUSCULAR; INTRAVENOUS EVERY 6 HOURS PRN
Status: DISPENSED | OUTPATIENT
Start: 2024-01-18 | End: 2024-01-19

## 2024-01-18 RX ORDER — HYDROXYZINE HYDROCHLORIDE 25 MG/1
25 TABLET, FILM COATED ORAL EVERY 6 HOURS PRN
Status: DISCONTINUED | OUTPATIENT
Start: 2024-01-18 | End: 2024-01-21 | Stop reason: HOSPADM

## 2024-01-18 RX ORDER — ALBUTEROL SULFATE 2.5 MG/3ML
2.5 SOLUTION RESPIRATORY (INHALATION) ONCE AS NEEDED
Status: DISCONTINUED | OUTPATIENT
Start: 2024-01-18 | End: 2024-01-21 | Stop reason: HOSPADM

## 2024-01-18 RX ADMIN — PHENYLEPHRINE HYDROCHLORIDE 50 MCG/MIN: 10 INJECTION INTRAVENOUS at 13:13

## 2024-01-18 RX ADMIN — Medication 62.5 MILLI-UNITS/MIN: at 14:50

## 2024-01-18 RX ADMIN — BUPIVACAINE HYDROCHLORIDE 2.5 ML: 2.5 INJECTION, SOLUTION EPIDURAL; INFILTRATION; INTRACAUDAL; PERINEURAL at 07:16

## 2024-01-18 RX ADMIN — BUPIVACAINE HYDROCHLORIDE 2.5 ML: 5 INJECTION, SOLUTION EPIDURAL; INTRACAUDAL at 07:16

## 2024-01-18 RX ADMIN — ROPIVACAINE HYDROCHLORIDE: 2 INJECTION, SOLUTION EPIDURAL; INFILTRATION at 06:26

## 2024-01-18 RX ADMIN — BUPIVACAINE HYDROCHLORIDE 2.5 ML: 5 INJECTION, SOLUTION EPIDURAL; INTRACAUDAL at 07:14

## 2024-01-18 RX ADMIN — CEFAZOLIN SODIUM 2000 MG: 2 SOLUTION INTRAVENOUS at 13:09

## 2024-01-18 RX ADMIN — KETOROLAC TROMETHAMINE 30 MG: 30 INJECTION, SOLUTION INTRAMUSCULAR; INTRAVENOUS at 13:51

## 2024-01-18 RX ADMIN — Medication 2 MILLI-UNITS/MIN: at 09:00

## 2024-01-18 RX ADMIN — FENTANYL CITRATE 50 MCG: 50 INJECTION INTRAMUSCULAR; INTRAVENOUS at 14:23

## 2024-01-18 RX ADMIN — ONDANSETRON 4 MG: 2 INJECTION INTRAMUSCULAR; INTRAVENOUS at 18:07

## 2024-01-18 RX ADMIN — METHYLERGONOVINE MALEATE 0.2 MG: 0.2 INJECTION INTRAVENOUS at 13:37

## 2024-01-18 RX ADMIN — ONDANSETRON 4 MG: 2 INJECTION INTRAMUSCULAR; INTRAVENOUS at 13:38

## 2024-01-18 RX ADMIN — CALCIUM CARBONATE (ANTACID) CHEW TAB 500 MG 1000 MG: 500 CHEW TAB at 05:01

## 2024-01-18 RX ADMIN — KETOROLAC TROMETHAMINE 30 MG: 30 INJECTION, SOLUTION INTRAMUSCULAR; INTRAVENOUS at 20:08

## 2024-01-18 RX ADMIN — DEXAMETHASONE SODIUM PHOSPHATE 10 MG: 10 INJECTION INTRAMUSCULAR; INTRAVENOUS at 13:38

## 2024-01-18 RX ADMIN — SODIUM CHLORIDE, SODIUM LACTATE, POTASSIUM CHLORIDE, AND CALCIUM CHLORIDE 125 ML/HR: .6; .31; .03; .02 INJECTION, SOLUTION INTRAVENOUS at 20:18

## 2024-01-18 RX ADMIN — FENTANYL CITRATE 100 MCG: 50 INJECTION INTRAMUSCULAR; INTRAVENOUS at 07:13

## 2024-01-18 RX ADMIN — DIPHENHYDRAMINE HYDROCHLORIDE 50 MG: 50 INJECTION, SOLUTION INTRAMUSCULAR; INTRAVENOUS at 02:13

## 2024-01-18 RX ADMIN — SODIUM CHLORIDE, SODIUM LACTATE, POTASSIUM CHLORIDE, AND CALCIUM CHLORIDE 125 ML/HR: .6; .31; .03; .02 INJECTION, SOLUTION INTRAVENOUS at 05:03

## 2024-01-18 RX ADMIN — LEVOTHYROXINE SODIUM 100 MCG: 100 TABLET ORAL at 05:48

## 2024-01-18 RX ADMIN — BUPIVACAINE HYDROCHLORIDE 2 ML: 2.5 INJECTION, SOLUTION EPIDURAL; INFILTRATION; INTRACAUDAL; PERINEURAL at 02:58

## 2024-01-18 RX ADMIN — BUPIVACAINE HYDROCHLORIDE 2 ML: 5 INJECTION, SOLUTION EPIDURAL; INTRACAUDAL at 02:58

## 2024-01-18 RX ADMIN — Medication 800 MILLI-UNITS/MIN: at 13:34

## 2024-01-18 RX ADMIN — ACETAMINOPHEN 650 MG: 325 TABLET, FILM COATED ORAL at 23:45

## 2024-01-18 RX ADMIN — AZITHROMYCIN MONOHYDRATE 500 MG: 500 INJECTION, POWDER, LYOPHILIZED, FOR SOLUTION INTRAVENOUS at 13:17

## 2024-01-18 RX ADMIN — SODIUM CHLORIDE, SODIUM LACTATE, POTASSIUM CHLORIDE, AND CALCIUM CHLORIDE 125 ML/HR: .6; .31; .03; .02 INJECTION, SOLUTION INTRAVENOUS at 01:50

## 2024-01-18 RX ADMIN — CALCIUM CARBONATE (ANTACID) CHEW TAB 500 MG 1000 MG: 500 CHEW TAB at 02:13

## 2024-01-18 RX ADMIN — LIDOCAINE HYDROCHLORIDE,EPINEPHRINE BITARTRATE 5 ML: 20; .005 INJECTION, SOLUTION EPIDURAL; INFILTRATION; INTRACAUDAL; PERINEURAL at 13:14

## 2024-01-18 RX ADMIN — SODIUM CHLORIDE, SODIUM LACTATE, POTASSIUM CHLORIDE, AND CALCIUM CHLORIDE 125 ML/HR: .6; .31; .03; .02 INJECTION, SOLUTION INTRAVENOUS at 11:43

## 2024-01-18 RX ADMIN — PROMETHAZINE HYDROCHLORIDE 12.5 MG: 25 INJECTION INTRAMUSCULAR; INTRAVENOUS at 21:09

## 2024-01-18 RX ADMIN — TRANEXAMIC ACID 1000 MG: 10 INJECTION, SOLUTION INTRAVENOUS at 13:35

## 2024-01-18 RX ADMIN — METOCLOPRAMIDE 5 MG: 5 INJECTION, SOLUTION INTRAMUSCULAR; INTRAVENOUS at 19:02

## 2024-01-18 RX ADMIN — LIDOCAINE HYDROCHLORIDE,EPINEPHRINE BITARTRATE 5 ML: 20; .005 INJECTION, SOLUTION EPIDURAL; INFILTRATION; INTRACAUDAL; PERINEURAL at 13:09

## 2024-01-18 RX ADMIN — LIDOCAINE HYDROCHLORIDE,EPINEPHRINE BITARTRATE 5 ML: 20; .005 INJECTION, SOLUTION EPIDURAL; INFILTRATION; INTRACAUDAL; PERINEURAL at 13:11

## 2024-01-18 RX ADMIN — FENTANYL CITRATE 50 MCG: 50 INJECTION INTRAMUSCULAR; INTRAVENOUS at 14:34

## 2024-01-18 RX ADMIN — MORPHINE SULFATE 3 MG: 1 INJECTION, SOLUTION EPIDURAL; INTRATHECAL; INTRAVENOUS at 13:50

## 2024-01-18 RX ADMIN — BUPIVACAINE HYDROCHLORIDE 2.5 ML: 2.5 INJECTION, SOLUTION EPIDURAL; INFILTRATION; INTRACAUDAL; PERINEURAL at 07:14

## 2024-01-18 RX ADMIN — SODIUM CHLORIDE, SODIUM LACTATE, POTASSIUM CHLORIDE, AND CALCIUM CHLORIDE 125 ML/HR: .6; .31; .03; .02 INJECTION, SOLUTION INTRAVENOUS at 15:31

## 2024-01-18 NOTE — ANESTHESIA POSTPROCEDURE EVALUATION
Post-Op Assessment Note    CV Status:  Stable  Pain Score: 0    Pain management: adequate      Post-op block assessment: catheter intact, site cleaned and no complications   Mental Status:  Alert and awake   Hydration Status:  Euvolemic   PONV Controlled:  Controlled   Airway Patency:  Patent     Post Op Vitals Reviewed: Yes      Staff: Anesthesiologist, CRNA               BP   112/61   Temp   98.7   Pulse  74   Resp   16   SpO2   95%

## 2024-01-18 NOTE — PLAN OF CARE
Problem: BIRTH - VAGINAL/ SECTION  Goal: Fetal and maternal status remain reassuring during the birth process  Description: INTERVENTIONS:  - Monitor vital signs  - Monitor fetal heart rate  - Monitor uterine activity  - Monitor labor progression (vaginal delivery)  - DVT prophylaxis  - Antibiotic prophylaxis  Outcome: Completed  Goal: Emotionally satisfying birthing experience for mother/fetus  Description: Interventions:  - Assess, plan, implement and evaluate the nursing care given to the patient in labor  - Advocate the philosophy that each childbirth experience is a unique experience and support the family's chosen level of involvement and control during the labor process   - Actively participate in both the patient's and family's teaching of the birth process  - Consider cultural, Yarsanism and age-specific factors and plan care for the patient in labor  Outcome: Completed     Problem: Knowledge Deficit  Goal: Verbalizes understanding of labor plan  Description: Assess patient/family/caregiver's baseline knowledge level and ability to understand information.  Provide education via patient/family/caregiver's preferred learning method at appropriate level of understanding.     1. Provide teaching at level of understanding.  2. Provide teaching via preferred learning method(s).  Outcome: Completed  Goal: Patient/family/caregiver demonstrates understanding of disease process, treatment plan, medications, and discharge instructions  Description: Complete learning assessment and assess knowledge base.  Interventions:  - Provide teaching at level of understanding  - Provide teaching via preferred learning methods  Outcome: Completed     Problem: Labor & Delivery  Goal: Manages discomfort  Description: Assess and monitor for signs and symptoms of discomfort.  Assess patient's pain level regularly and per hospital policy.  Administer medications as ordered. Support use of nonpharmacological methods to help  control pain such as distraction, imagery, relaxation, and application of heat and cold.  Collaborate with interdisciplinary team and patient to determine appropriate pain management plan.    1. Include patient in decisions related to comfort.  2. Offer non-pharmacological pain management interventions.  3. Report ineffective pain management to physician.  Outcome: Completed  Goal: Patient vital signs are stable  Description: 1. Assess vital signs - vaginal delivery.  Outcome: Completed     Problem: PAIN - ADULT  Goal: Verbalizes/displays adequate comfort level or baseline comfort level  Description: Interventions:  - Encourage patient to monitor pain and request assistance  - Assess pain using appropriate pain scale  - Administer analgesics based on type and severity of pain and evaluate response  - Implement non-pharmacological measures as appropriate and evaluate response  - Consider cultural and social influences on pain and pain management  - Notify physician/advanced practitioner if interventions unsuccessful or patient reports new pain  Outcome: Completed     Problem: INFECTION - ADULT  Goal: Absence or prevention of progression during hospitalization  Description: INTERVENTIONS:  - Assess and monitor for signs and symptoms of infection  - Monitor lab/diagnostic results  - Monitor all insertion sites, i.e. indwelling lines, tubes, and drains  - Monitor endotracheal if appropriate and nasal secretions for changes in amount and color  - Hern[place] appropriate cooling/warming therapies per order  - Administer medications as ordered  - Instruct and encourage patient and family to use good hand hygiene technique  - Identify and instruct in appropriate isolation precautions for identified infection/condition  Outcome: Completed  Goal: Absence of fever/infection during neutropenic period  Description: INTERVENTIONS:  - Monitor WBC    Outcome: Completed     Problem: SAFETY ADULT  Goal: Patient will remain free of  falls  Description: INTERVENTIONS:  - Educate patient/family on patient safety including physical limitations  - Instruct patient to call for assistance with activity   - Consult OT/PT to assist with strengthening/mobility   - Keep Call bell within reach  - Keep bed low and locked with side rails adjusted as appropriate  - Keep care items and personal belongings within reach  - Initiate and maintain comfort rounds  - Make Fall Risk Sign visible to staff  - Apply yellow socks and bracelet for high fall risk patients  - Consider moving patient to room near nurses station  Outcome: Completed  Goal: Maintain or return to baseline ADL function  Description: INTERVENTIONS:  -  Assess patient's ability to carry out ADLs; assess patient's baseline for ADL function and identify physical deficits which impact ability to perform ADLs (bathing, care of mouth/teeth, toileting, grooming, dressing, etc.)  - Assess/evaluate cause of self-care deficits   - Assess range of motion  - Assess patient's mobility; develop plan if impaired  - Assess patient's need for assistive devices and provide as appropriate  - Encourage maximum independence but intervene and supervise when necessary  - Involve family in performance of ADLs  - Assess for home care needs following discharge   - Consider OT consult to assist with ADL evaluation and planning for discharge  - Provide patient education as appropriate  Outcome: Completed  Goal: Maintains/Returns to pre admission functional level  Description: INTERVENTIONS:  - Perform AM-PAC 6 Click Basic Mobility/ Daily Activity assessment daily.  - Set and communicate daily mobility goal to care team and patient/family/caregiver.   - Collaborate with rehabilitation services on mobility goals if consulted  - Out of bed for toileting  - Record patient progress and toleration of activity level   Outcome: Completed     Problem: DISCHARGE PLANNING  Goal: Discharge to home or other facility with appropriate  resources  Description: INTERVENTIONS:  - Identify barriers to discharge w/patient and caregiver  - Arrange for needed discharge resources and transportation as appropriate  - Identify discharge learning needs (meds, wound care, etc.)  - Arrange for interpretive services to assist at discharge as needed  - Refer to Case Management Department for coordinating discharge planning if the patient needs post-hospital services based on physician/advanced practitioner order or complex needs related to functional status, cognitive ability, or social support system  Outcome: Completed

## 2024-01-18 NOTE — OB LABOR/OXYTOCIN SAFETY PROGRESS
Oxytocin Safety Progress Check Note - Chikis Amador 38 y.o. female MRN: [id number]    Unit/Bed#: -01 Encounter: [id number]    Dose (filippo-units/min) Oxytocin: 30 filippo-units/min  Contraction Frequency (minutes): 3-3.5  Contraction Intensity: Moderate  Uterine Activity Characteristics: Irritability  Cervical Dilation: 5        Cervical Effacement: 90  Fetal Station: -2  Baseline Rate (FHR): 145 bpm  Fetal Heart Rate (FHT): 158 BPM  FHR Category: I                Vital Signs:   Vitals:    01/18/24 0004   BP:    Pulse: 60   Resp: 18   Temp: 98.8 °F (37.1 °C)   SpO2: 98%       Notes/comments:   Patient feeling more pressure. SVE unchanged. Plan to continue with pitocin.      Destinee Morgan MD 1/18/2024 12:36 AM

## 2024-01-18 NOTE — OB LABOR/OXYTOCIN SAFETY PROGRESS
Oxytocin Safety Progress Check Note - Chikis Amador 38 y.o. female MRN: [id number]    Unit/Bed#: -01 Encounter: [id number]    Dose (filippo-units/min) Oxytocin: 30 filippo-units/min  Contraction Frequency (minutes): 3  Contraction Intensity: Moderate  Uterine Activity Characteristics: Irritability  Cervical Dilation: 5        Cervical Effacement: 90  Fetal Station: -2  Baseline Rate (FHR): 145 bpm  Fetal Heart Rate (FHT): 130 BPM  FHR Category: I               Vital Signs:   Vitals:    01/18/24 0220   BP:    Pulse: 61   Resp:    Temp:    SpO2: 98%       Notes/comments:   Cervical exam deferred at this time. Anesthesia is going to evaluate epidural and potentially bolus. Plan for IV benadryl and calcium carbonate. Pitocin at 30 mU/min. Plan to reassess in 2 hours or sooner if clinically indicated.      Destinee Morgan MD 1/18/2024 2:32 AM

## 2024-01-18 NOTE — OB LABOR/OXYTOCIN SAFETY PROGRESS
Oxytocin Safety Progress Check Note - Chikis Amador 38 y.o. female MRN: [id number]    Unit/Bed#: -01 Encounter: [id number]    Dose (filippo-units/min) Oxytocin: 30 filippo-units/min  Contraction Frequency (minutes): 2-4  Contraction Intensity: Moderate  Uterine Activity Characteristics: Irritability  Cervical Dilation: 6        Cervical Effacement: 90  Fetal Station: -1  Baseline Rate (FHR): 145 bpm  Fetal Heart Rate (FHT): 141 BPM  FHR Category: I               Vital Signs:   Vitals:    01/18/24 0618   BP: 121/66   Pulse: 58   Resp:    Temp:    SpO2:        Notes/comments:   Patient feels the need to push. FHT cat I. Plan to reassess in 2 hours or sooner if clinically indicated.      Destinee Morgan MD 1/18/2024 6:41 AM

## 2024-01-18 NOTE — OB LABOR/OXYTOCIN SAFETY PROGRESS
Oxytocin Safety Progress Check Note - Chikis Amador 38 y.o. female MRN: [id number]    Unit/Bed#: -01 Encounter: [id number]    Dose (filippo-units/min) Oxytocin: 30 filippo-units/min  Contraction Frequency (minutes): 3  Contraction Intensity: Moderate  Uterine Activity Characteristics: Irritability  Cervical Dilation: 5        Cervical Effacement: 90  Fetal Station: -1  Baseline Rate (FHR): 145 bpm  Fetal Heart Rate (FHT): 132 BPM  FHR Category: II               Vital Signs:   Vitals:    01/18/24 0430   BP: (!) 84/50   Pulse: 81   Resp:    Temp:    SpO2:        Notes/comments:   Intermittent late variable decelerations with moderate variability and accelerations present. SVE as above. Plan for 1000 mg of calcium carbonate. Will reassess in 2 hours or sooner if clinically indicated.      Destinee Morgan MD 1/18/2024 4:53 AM

## 2024-01-18 NOTE — DISCHARGE INSTRUCTIONS
Self Care After Delivery   AMBULATORY CARE:   The postpartum period is the period of time from delivery to about 6 weeks. During this time you may experience many physical and emotional changes. It is important to understand what is normal and when you need to call your healthcare provider. It is also important to know how to care for yourself during this time.  Call your local emergency number (911 in the [place]) for any of the following:   You see or hear things that are not there, or have thoughts of harming yourself or your baby.     You soak through 1 pad in 15 minutes, have blurry vision, clammy or pale skin, and feel faint.     You faint or lose consciousness.     You have trouble breathing.     You cough up blood.     Your  incision comes apart.     Seek care immediately if:   Your heart is beating faster than usual.     You have a bad headache or changes in your vision.     Your perineal tear, episiotomy site, or  incision is red, swollen, bleeding, or draining pus.     You have severe abdominal pain.     Call your doctor or obstetrician if:   Your leg is painful, red, and larger than usual.     You soak through 1 or more pads in an hour, or pass blood clots larger than a quarter from your vagina.     You have a fever.     You have new or worsening pain in your abdomen or vagina.     You continue to have depression 1 to 2 weeks after you deliver.     You have trouble sleeping.     You have foul-smelling discharge from your vagina.     You have pain or burning when you urinate.     You do not have a bowel movement for 3 days or more.     You have nausea or are vomiting.     You have hard lumps or red streaks over your breasts.     You have cracked nipples or bleed from your nipples.     You have questions or concerns about your condition or care.     Physical changes: The following are normal changes after you give birth:  Pain in the area between your anus and vagina     Breast pain      Constipation or hemorrhoids     Hot or cold flashes     Vaginal bleeding or discharge     Mild to moderate abdominal cramping     Difficulty controlling bowel movements or urine     Emotional changes: A drop in hormone levels after you deliver may cause changes in your emotions. You may feel irritable, sad, or anxious. You may cry easily or for no reason. You may also feel depressed. Depression that continues can be a sign of postpartum depression, a condition that can be treated. Treatment may include talk therapy, medicines, or both. Healthcare providers will ask how you are feeling and if you have any depression. These talks can happen during appointments for your medical care and for your baby's care, such as well child visits. Providers can help you find ways to care for yourself and your baby. Talk to your providers about the following:  When emotional changes or depression started, and if it is getting worse over time     Problems you are having with daily activities, sleep, or caring for your baby     If anything makes you feel worse, or makes you feel better     Feeling that you are not bonding with your baby the way you want     Any problems your baby has with sleeping or feeding     Your baby is fussy or cries a lot     Support you have from friends, family, or others     Breast care for breastfeeding mothers: You may have sore breasts for 3 to 6 days after you give birth. This happens as your milk begins to fill your breasts. You may also have sore breasts if you do not breastfeed frequently. Do the following to care for your breasts:  Apply a moist, warm, compress to your breast as directed. This may help soothe your breasts. Make sure the washcloth is not too hot before you apply it to your breast.     Nurse your baby or pump your milk frequently. This may prevent clogged milk ducts. Ask your healthcare provider how often to nurse or pump.     Massage your breasts as directed. This may help increase  your milk flow. Gently rub your breasts in a circular motion before you breastfeed. You may need to gently squeeze your breast or nipple to help release milk. You can also use a breast pump to help release milk from your breast.     Wash your breasts with warm water only. Do not put soap on your nipples. Soap may cause your nipples to become dry.     Apply lanolin cream to your nipples as directed. Lanolin cream may add moisture to your skin and prevent nipple dryness. Always wash off lanolin cream with warm water before you breastfeed.     Place pads in your bra. Your nipples may leak milk when you are not breastfeeding. You can place pads inside of your bra to help prevent leaking onto your clothing. Ask your healthcare provider where to purchase bra pads.     Get breastfeeding support if needed. Healthcare providers can answer questions about breastfeeding and provide you with support. Ask your healthcare provider who you can contact if you need breastfeeding support.     Breast care for non-breastfeeding mothers: Milk will fill your breasts even if you bottle feed your baby. Do the following to help stop your milk from filling your breasts and causing pain:  Wear a bra with support at all times. A sports bra or a tight-fitting bra will help stop your milk from coming in.     Apply ice on each breast for 15 to 20 minutes every hour or as directed. Use an ice pack, or put crushed ice in a plastic bag. Cover it with a towel before you apply it to your breast. Ice helps your milk ducts shrink.     Keep your breasts away from warm water. Warm water will make it easier for milk to fill your breasts. Stand with your breasts away from warm water in the shower.     Limit how much you touch your breasts. This will prevent them from filling with milk.     Perineum care: Your perineum is the area between your rectum and vagina. It is normal to have swelling and pain in this area after you give birth. If you had an  episiotomy, your healthcare provider may give you special instructions.  Clean your perineum after you use the bathroom. This may prevent infection and help with healing. Use a spray bottle with warm water to clean your perineum. You may also gently spray warm water against your perineum when you urinate. Always wipe front to back.     Take a sitz bath as directed. A sitz bath may help relieve swelling and pain. Fill your bath tub or bucket with water up to your hips and sit in the water. Use cold water for 2 days after you deliver. Then use warm water. Ask your healthcare provider for more information about a sitz bath.     Apply ice packs for the first 24 hours or as directed. Use a plastic glove filled with ice or buy an ice pack. Wrap the ice pack or plastic glove in a small towel or wash cloth. Place the ice pack on your perineum for 20 minutes at a time.     Sit on a donut-shaped pillow. This may relieve pressure on your perineum when you sit.     Use wipes that contain medicine or take pills as directed. Your healthcare provider may tell you to use witch hazel pads. You can place witch hazel pads in the refrigerator before you apply them to your perineum. Your provider may also tell you to take NSAIDs. Ask him or her how often to take pills or use the wipes.     Do not go swimming or take tub baths for 4 to 6 weeks or as directed. This will help prevent an infection in your vagina or uterus.     Bowel and bladder care: It may take 3 to 5 days to have a bowel movement after you deliver your baby. You can do the following to prevent or manage constipation, and get control of your bowel or bladder:  Take stool softeners as directed. A stool softener is medicine that will make your bowel movements softer. This may prevent or relieve constipation. A stool softener may also make bowel movements less painful.     Drink plenty of liquids. Ask how much liquid to drink each day and which liquids are best for you.  Liquids may help prevent constipation.     Eat foods high in fiber. Examples include fruits, vegetables, grains, beans, and lentils. Ask your healthcare provider how much fiber you need each day. Fiber may prevent constipation.          Do Kegel exercises as directed. Kegel exercises will help strengthen the muscles that control bowel movements and urination. Ask your healthcare provider for more information on Kegel exercises.     Apply cold compresses or medicine to hemorrhoids as directed. This may relieve swelling and pain. Your healthcare provider may tell you to apply ice or wipes that contain medicine to your hemorrhoids. He or she may also tell you to use a sitz bath. Ask your provider for more information on how to manage hemorrhoids.     Nutrition: Good nutrition is important in the postpartum period. It will help you return to a healthy weight, increase your energy levels, and prevent constipation. It will also help you get enough nutrients and calories if you are going to breastfeed your baby.  Eat a variety of healthy foods. Healthy foods include fruits, vegetables, whole-grain breads, low-fat dairy products, beans, lean meats, and fish. You may need 500 to 700 extra calories each day if you breastfeed your baby. You may also need extra protein.          Limit foods with added sugar and high amounts of fat. These foods are high in calories and low in healthy nutrients. Read food labels so you know how much sugar and fat is in the food you want to eat.     Drink 8 to 10 glasses of water per day. Water will help you make plenty of milk for your baby. It will also help prevent constipation. Drink a glass of water every time you breastfeed your baby.     Take vitamins as directed. Ask your healthcare provider what vitamins you need.     Limit caffeine and alcohol if you are breastfeeding. Caffeine and alcohol can get into your breast milk. Caffeine and alcohol can make your baby fussy. They can also  interfere with your baby's sleep. Ask your healthcare provider if you can drink alcohol or caffeine.     Rest and sleep: You may feel very tired in the postpartum period. Enough sleep will help you heal and give you energy to care for your baby. The following may help you get sleep and rest:  Nap when your baby naps. Your baby may nap several times during the day. Get rest during this time.     Limit visitors. Many people may want to see you and your baby. Ask friends or family to visit on different days. This will give you time to rest.     Do not plan too much for one day. Put off household chores so that you have time to rest. Gradually do more each day.     Ask for help from family, friends, or neighbors. Ask them to help you with laundry, cleaning, or errands. Also ask someone to watch the baby while you take a nap or relax. Ask your partner to help with the care of your baby. Pump some of your breast milk so your partner can feed your baby during the night.     Exercise after delivery: Wait until your healthcare provider says it is okay to exercise. Exercise can help you lose weight, increase your energy levels, and manage your mood. It can also prevent constipation and blood clots. Start with gentle exercises such as walking. Do more as you have more energy. You may need to avoid abdominal exercises for 1 to 2 weeks after you deliver. Talk to your healthcare provider about an exercise plan that is right for you.     Sexual activity after delivery:   Do not have sex until your healthcare provider says it is okay. You may need to wait 4 to 6 weeks before you have sex. This may prevent infection and allow time to heal.     Your menstrual cycle may begin as soon as 3 weeks after you deliver. Your period may be delayed if you breastfeed your baby. You can become pregnant before you get your first postpartum period. Talk to your healthcare provider about birth control that is right for you. Some types of birth  control are not safe during breastfeeding.     For support and more information: Join a support group for new mothers. Ask for help from family and friends with chores, errands, and care of your baby.  Office of Women's Health,  Department of Health and Human Services  [address] [place] [address]  [address] [place] [address]  Phone: [phone]  Web Address: www.womenshealth.gov  [facility]partum [facility]  [address] [place] [address]  Web Address: [email or web address]  Follow up with your doctor or obstetrician as directed: You will need to follow up within 2 to 6 weeks of delivery. Write down your questions so you remember to ask them at your visits.  © Copyright [employer] 2020 Information is for End User's use only and may not be sold, redistributed or otherwise used for commercial purposes. All illustrations and images included in CareNotes® are the copyrighted property of [employer] or [employer]  The above information is an  only. It is not intended as medical advice for individual conditions or treatments. Talk to your doctor, nurse or pharmacist before following any medical regimen to see if it is safe and effective for you.

## 2024-01-18 NOTE — PLAN OF CARE
Problem: POSTPARTUM  Goal: Experiences normal postpartum course  Description: INTERVENTIONS:  - Monitor maternal vital signs  - Assess uterine involution and lochia  Outcome: Progressing  Goal: Appropriate maternal -  bonding  Description: INTERVENTIONS:  - Identify family support  - Assess for appropriate maternal/infant bonding   -Encourage maternal/infant bonding opportunities  - Referral to  or  as needed  Outcome: Progressing  Goal: Establishment of infant feeding pattern  Description: INTERVENTIONS:  - Assess breast/bottle feeding  - Refer to lactation as needed  Outcome: Progressing  Goal: Incision(s), wounds(s) or drain site(s) healing without S/S of infection  Description: INTERVENTIONS  - Assess and document dressing, incision, wound bed, drain sites and surrounding tissue  - Provide patient and family education    Outcome: Progressing     Problem: SAFETY ADULT  Goal: Patient will remain free of falls  Description: INTERVENTIONS:  - Educate patient/family on patient safety including physical limitations  - Instruct patient to call for assistance with activity   - Consult OT/PT to assist with strengthening/mobility   - Keep Call bell within reach  - Keep bed low and locked with side rails adjusted as appropriate  - Keep care items and personal belongings within reach  - Initiate and maintain comfort rounds  - Make Fall Risk Sign visible to staff  - Apply yellow socks and bracelet for high fall risk patients  - Consider moving patient to room near nurses station  Outcome: Progressing  Goal: Maintain or return to baseline ADL function  Description: INTERVENTIONS:  -  Assess patient's ability to carry out ADLs; assess patient's baseline for ADL function and identify physical deficits which impact ability to perform ADLs (bathing, care of mouth/teeth, toileting, grooming, dressing, etc.)  - Assess/evaluate cause of self-care deficits   - Assess range of motion  - Assess  patient's mobility; develop plan if impaired  - Assess patient's need for assistive devices and provide as appropriate  - Encourage maximum independence but intervene and supervise when necessary  - Involve family in performance of ADLs  - Assess for home care needs following discharge   - Consider OT consult to assist with ADL evaluation and planning for discharge  - Provide patient education as appropriate  Outcome: Progressing  Goal: Maintains/Returns to pre admission functional level  Description: INTERVENTIONS:  - Perform AM-PAC 6 Click Basic Mobility/ Daily Activity assessment daily.  - Set and communicate daily mobility goal to care team and patient/family/caregiver.   - Collaborate with rehabilitation services on mobility goals if consulted  - Out of bed for toileting  - Record patient progress and toleration of activity level   Outcome: Progressing     Problem: DISCHARGE PLANNING  Goal: Discharge to home or other facility with appropriate resources  Description: INTERVENTIONS:  - Identify barriers to discharge w/patient and caregiver  - Arrange for needed discharge resources and transportation as appropriate  - Identify discharge learning needs (meds, wound care, etc.)  - Arrange for interpretive services to assist at discharge as needed  - Refer to Case Management Department for coordinating discharge planning if the patient needs post-hospital services based on physician/advanced practitioner order or complex needs related to functional status, cognitive ability, or social support system  Outcome: Progressing     Problem: Knowledge Deficit  Goal: Patient/family/caregiver demonstrates understanding of disease process, treatment plan, medications, and discharge instructions  Description: Complete learning assessment and assess knowledge base.  Interventions:  - Provide teaching at level of understanding  - Provide teaching via preferred learning methods  Outcome: Progressing

## 2024-01-18 NOTE — OB LABOR/OXYTOCIN SAFETY PROGRESS
Labor Progress Note - Chikis Amador 38 y.o. female MRN: [id number]    Unit/Bed#: -01 Encounter: [id number]    Dose (filippo-units/min) Oxytocin: 0 filippo-units/min  Contraction Frequency (minutes): 6  Contraction Intensity: Moderate  Uterine Activity Characteristics: Irritability  Cervical Dilation: 5-6        Cervical Effacement: 90  Fetal Station: -1  Baseline Rate (FHR): 140 bpm  Fetal Heart Rate (FHT): 152 BPM  FHR Category: cat II           Vital Signs:   Vitals:    24 1157   BP: 105/63   Pulse: 63   Resp:    Temp:    SpO2:        Notes/comments:   Reviewed has been on pitocin for almost 36 hours  Has had very slow progression and remains in latent phase of labor    During this time, Oxytocin was discontinued for a cumulative duration of approximately 2 hours due to reaching maximum level of 30 for multiple hours. Currently, the contraction pattern shows contractions every 5 minutes. Have tried repositioning multiple times using spinning babies techniques.    Maternal status is stable.     Fetal status shows a Category II FHR tracing for non-recurrent late decelerations.  Reviewed suspect may need  section  Can continue induction, but also reasonable to move forward with . Will discuss with partner and we will discuss in a few minutes again.      Lamar Almanzar MD 2024 12:10 PM

## 2024-01-18 NOTE — ANESTHESIA POSTPROCEDURE EVALUATION
Post-Op Assessment Note    CV Status:  Stable  Pain Score: 0    Pain management: adequate       Mental Status:  Alert and awake   Hydration Status:  Euvolemic   PONV Controlled:  Controlled   Airway Patency:  Patent     Post Op Vitals Reviewed: Yes      Staff: Anesthesiologist               BP      Temp      Pulse     Resp      SpO2

## 2024-01-18 NOTE — OB LABOR/OXYTOCIN SAFETY PROGRESS
Labor Progress Note - Chikis Amador 38 y.o. female MRN: [id number]    Unit/Bed#: -01 Encounter: [id number]    Dose (filippo-units/min) Oxytocin: 0 filippo-units/min  Contraction Frequency (minutes): x1  Contraction Intensity: Mild  Uterine Activity Characteristics: Irritability  Cervical Dilation: 5-6        Cervical Effacement: 90  Fetal Station: -1  Baseline Rate (FHR): 145 bpm  Fetal Heart Rate (FHT): 152 BPM  FHR Category: cat i           Vital Signs:   Vitals:    24 1227   BP: 112/70   Pulse: 80   Resp:    Temp:    SpO2:        Notes/comments:   SAFETY HUDDLE:  TRIGGER: Patient meets criteria for Failed Induction of Labor    PARTICIPANTS: Yessi (bedside RN), Zuly (Charge RN), Joanie (chief resident)    REVIEW OF CURRENT PLAN OF CARE AND MANAGEMENT:   Chikis is in the latent phase of labor. The cervical exam has remained unchanged and membranes have been ruptured and Oxytocin has been running for approximately more than 18 hours. During this time, Oxytocin was discontinued for a cumulative duration of approximately 2 hours due to reaching maximum pitocin of 30 twice for multiple hours. Currently, the contraction pattern shows contractions every 5 minutes when on pitocin, once we paused pitocin, no contractions occurred. The cervical exam has remained at approx 5cm since 9PM. There was a change in examiners early in the morning, but not felt to transition to activecm.    Maternal status is stable.   Fetal status shows a Category i FHR tracing.    I recommend delivery by  section for Failed Induction of Labor. I have discussed the risks and benefits of  delivery with Chikis, including postpartum hemorrhage, infection, need to consider TOLAC versus repeat CS for future pregnancies .     TIMELINE FOR NEXT ASSESSMENT: NA    ALL PARTICIPANTS IN AGREEMENT WITH PLAN OF CARE: Yes    IS PERRT REQUIRED: No     Lamar Almanzar MD 2024 12:43 PM

## 2024-01-18 NOTE — OB LABOR/OXYTOCIN SAFETY PROGRESS
Oxytocin Safety Progress Check Note - Chikis Amador 38 y.o. female MRN: [id number]    Unit/Bed#: -01 Encounter: [id number]    Dose (filippo-units/min) Oxytocin: 22 filippo-units/min  Contraction Frequency (minutes): 4-5.5  Contraction Intensity: Moderate, Moderate/Strong  Uterine Activity Characteristics: Irritability  Cervical Dilation: 5        Cervical Effacement: 90  Fetal Station: -2  Baseline Rate (FHR): 150 bpm  Fetal Heart Rate (FHT): 148 BPM  FHR Category: Cat 1               Vital Signs:   Vitals:    01/17/24 2115 01/17/24 2130 01/17/24 2144 01/17/24 2155   BP: 123/73 110/66 120/63    BP Location:       Patient Position:       Pulse: 65 68 64 64   Resp:    18   Temp:    97.5 °F (36.4 °C)   TempSrc:    Oral   SpO2:    100%   Weight:       Height:           Notes/comments:   Chikis is comfortable with her epidural. Cervical exam is minimally changed. IUPC placed at this time. She had a 4 late decelerations in a row that resolved with repositioning and now she is back to category 1 FHT. Will continue to monitor and reassess as indicated.     Mariama Gibson MD 1/17/2024 9:55 PM

## 2024-01-18 NOTE — DISCHARGE SUMMARY
Discharge Summary - Chikis Amador 38 y.o. female MRN: [id number]    Unit/Bed#: -01 Encounter: [id number]    ADMISSION  Admission Date: 2024   Admitting Attending: Dr. Duran  Admitting Diagnoses:   Patient Active Problem List   Diagnosis    38 weeks gestation of pregnancy     Hypothyroid in pregnancy, antepartum, third trimester    Multigravida of advanced maternal age in third trimester    Conceived by in vitro fertilization    Uterine fibroid in pregnancy       DELIVERY  Delivery Method: , Low Transverse    Delivery Date and Time: 2024  1:34 PM   Delivery Attending: Lamar Almanzar     DISCHARGE  Discharge Date: 2024  Discharge Attending: Dr. Freeman  Discharge Diagnosis:   Same, Delivered    Clinical course: Admission to Delivery  Chikis presented to labor and delivery for prelabor rupture of membranes.  She was 1/80/-3 on presentation and was started on Pitocin.  She received an epidural for pain control and was titrated up to a Pitocin of 30 milliunits/min.  She made a little changed to 3.5/90/-2 and remained in bed of 30 for many hours.  She was given a Pitocin break and then the Pitocin was restarted at 16 milliunits/min at 1830 on 2024.  She continued to make a small amount of change to 5/90/-2 and an IUPC was placed.  She was retitrated to a PIP of 30 milliunits/min with adequate contractions.  She still continues to not make cervical change.  Pitocin was turned off again and she was given a pit break.she began to retitrate Pitocin fourth time and was not having adequate contractions.  At approximately 1215 24 the the patient was counseled about on meeting criteria for failed induction of labor and was recommended to proceed with a primary low-transverse  section for this indication. Risks, benefits, possible complications, alternate treatment options, and expected outcomes were discussed with the patient.  The patient agreed with the proposed plan and  gave informed consent for a 1LTCS.     Delivery  Route of Delivery: , Low Transverse   Reason for  delivery: Arrest of Dilation       Anesthesia: Epidural ,   QBL: 378 cc      Delivery: , Low Transverse  at 2024  1:34 PM       Baby's Weight: 3629 g (8 lb) ; 128     Apgar scores: 8  and 9  at 1 and 5 minutes, respectively      Clinical Course: Post-Delivery:  The post delivery course was unremarkable.  She had some dizziness that improved with IV fluids.  CBC and CMP were drawn at the time of symptoms and were within normal limits postoperatively.  She had orthostatic blood pressures taken that were stable.    On the day of discharge, the patient was ambulating, voiding spontaneously, tolerating oral intake, and hemodynamically stable. She was able to reasonably perform all ADLs. She had appropriate bowel function. Pain was well-controlled. She was discharged home on postpartum/postop day #3 without complications. Patient was instructed to follow up with her OB as an outpatient and was given appropriate warnings to call her provider with problems or concerns.    Pertinent lab findings included:   Blood type A positive.     Last three Hgb values:  Lab Results   Component Value Date    HGB 11.3 (L) 2024    HGB 12.4 2024    HGB 12.3 2024        Problem-specific follow-up plans included the following:  Problem List       * (Principal) S/P primary low transverse     Current Assessment & Plan     Routine postpartum care  Encourage ambulation  Encourage familial bonding  Lactation support as needed  Pain: Motrin/Tylenol around the clock, oxycodone if needed  Postpartum Contraceptive plan: declines  Pre-delivery Hgb 12.3 --> 12.4  Johnson catheter: passed VT  DVT Ppx: ambulation, SCDs, Lovenox 40mg         Hypothyroid in pregnancy, antepartum, third trimester    Overview     Continue Levothyroxine  Referral to Endocrinology given per patient request         Current  Assessment & Plan     Levothyroxine 100 mcg in the am         Multigravida of advanced maternal age in third trimester    Overview     Normal NIPT         Conceived by in vitro fertilization    Uterine fibroid in pregnancy    Current Assessment & Plan     1.  myometrium fibroid located in the anterior corpus region, measuring  1.9 x 0.9 x 1.7cm with a volume of 1.5cc. Color doppler flow was not  increased. The appearance was hypoechoic.     2.  myometrium fibroid located in the right posterior corpus region,  measuring 4.6 x 4.0 x 4.9cm with a volume of 47.2cc. Color doppler flow  was not increased. The appearance was heterogeneous.             Other Visit Diagnoses       Failed induction of labor, antepartum                 Discharge med list:  Contraception: declines     Medication List      ASK your doctor about these medications     aspirin 81 mg chewable tablet; Chew 2 tablets (162 mg total) daily   levothyroxine 100 mcg tablet   omeprazole 20 mg delayed release capsule; Commonly known as: PriLOSEC;   TAKE 1 CAPSULE BY MOUTH EVERY DAY   PRENATAL PO       Condition at discharge:   good     Disposition:   Home    Planned Readmission:   No    Leela De Luna MD  PGY-1 OB/GYN    Case and note reviewed agree.

## 2024-01-18 NOTE — OP NOTE
OPERATIVE REPORT  PATIENT NAME: Chikis Amador    :  1985  MRN: [id number]  Pt Location: [facility] L&D OR ROOM 02    SURGERY DATE: 2024    Surgeons and Role:     * Lamar Almanzar MD - Primary     * Mariam Howard MD - Assisting     Section Procedure Note    Indications:   Failed induction of labor    Pre-operative Diagnosis:   38w1d pregnancy  Prelabor rupture of membranes  Hypothyroidism    Post-operative Diagnosis:   1LTCS     Attending: Lamar Almanzar MD  Resident: Mariam Howard MD    Maternal Findings:  Normal uterus  Normal tubes and ovaries bilaterally  No adhesions  No difficulty noted from skin to delivery     Findings:  Viable male weighing 8lbs 0oz;  Apgar scores of 8 at one minute and 9 at five minutes.   Clear amniotic fluid  Normal placenta with 3-vessel cord    Arterial and Venous Gases:  Umbilical Cord Venous Blood Gas:  Results from last 7 days   Lab Units 24  1336   PH COV  7.382   PCO2 COV mm HG 39.6   HCO3 COV mmol/L 23.0   BASE EXC COV mmol/L -1.8*   O2 CT CD VB mL/dL 16.2   O2 HGB, VENOUS CORD % 65.2     Umbilical Cord Arterial Blood Gas:        Specimens: Arterial and venous cord gases, cord blood, segment of umbilical cord, placenta to storage    Quantitative Blood Loss: 378 mL    Drains: Johnson catheter           Complications:  None; patient tolerated the procedure well.           Disposition: PACU            Condition: stable    Procedure Details   Chikis presented to labor and delivery for prelabor rupture of membranes.  She was 1/80/-3 on presentation and was started on Pitocin.  She received an epidural for pain control and was titrated up to a Pitocin of 30 milliunits/min.  She made a little changed to 3.5/90/-2 and remained in bed of 30 for many hours.  She was given a Pitocin break and then the Pitocin was restarted at 16 milliunits/min at 1830 on 2024.  She continued to make a small amount of change to 5/90/-2 and an IUPC  was placed.  She was retitrated to a PIP of 30 milliunits/min with adequate contractions.  She still continues to not make cervical change.  Pitocin was turned off again and she was given a pit break.she began to retitrate Pitocin fourth time and was not having adequate contractions.  At approximately 1215 24 the the patient was counseled about on meeting criteria for failed induction of labor and was recommended to proceed with a primary low-transverse  section for this indication. Risks, benefits, possible complications, alternate treatment options, and expected outcomes were discussed with the patient.  The patient agreed with the proposed plan and gave informed consent for a 1LTCS.    The patient was taken to the OB Operating Room at 1310 where she received a rebolus of her epidural. For infection prophylaxis, she received 2g ancef and 500 mg azithromycin preoperatively. Fetal heart tones in the OR were assessed and noted to be within normal limits and a Johsnon catheter and SCDs were placed.  The abdomen was prepped with Chloraprep, the vagina was prepped with Chlorhexidine, and following appropriate drying time, the patient was draped in the usual sterile manner.   A Time Out was held and the above information confirmed.  The patient was identified as Chikis Amador and the procedure verified as a  Delivery for failed induction of labor.    A Pfannenstiel incision was made and carried down through the underlying subcutaneous tissue to the fascia using a scalpel. The rectus fascia was then nicked in the midline and dissected laterally using Donnelly scissors.  The inferior edge of the fascial incision was grasped with Kocher clamps bilaterally and tented upward and the underlying musculature was dissected away bluntly. Then the superior edge of the fascial incision was grasped with Kocher clamps bilaterally, tented upward and the underlying rectus muscles were dissected off bluntly laterally and  at the midline with a scalpel. The rectus muscles were  and the peritoneum was identified, entered, and extended longitudinally with blunt dissection. The Gualberto Retractor was inserted. A low transverse uterine incision was made with the scalpel and extended in a cephalocaudad manner with blunt dissection. The amnion was entered bluntly.      The fetal head was palpated, elevated, and delivered through the uterine incision followed by the body without difficulty. Time of birth was noted at 1334. There was noted to be spontaneous cry and good tone. There was no apparent injury to the . The umbilical cord was doubly clamped and cut after 30 seconds to allow for delayed cord clamping. The infant was handed off to the  providers.  Arterial and venous cord gases, cord blood, and a segment of umbilical cord were obtained for evaluation. The placenta delivered spontaneously at 1336 with uterine fundal massage and appeared normal. The uterus was cleaned out with a moist lap sponge.     The uterine incision was closed with a running locked suture of 0 Monocryl in a single layer. Hemostasis was noted to be excellent. The uterus was returned to the abdomen. The Gualberto retractor was removed. The fascia was closed with a running suture of 0 Vicryl. Subcutaneous adipose tissue was closed with a running suture of 2-0 Vicryl.  The skin was closed with a subcuticular running suture of 4-0 Monocryl Stratafix. Exofin was applied.      The patient appeared to tolerate the procedure very well.  Lap sponge, needle, and instrument counts were correct x2.  The patient's fundus was palpated and the uterus was expressed. She was then cleaned and transferred to her postpartum recovery room in stable condition and her infant went to the  nursery.    Attending Attestation: Dr. Lamar Almanzar MD was present for the entire procedure.    Mariam Howard MD  OB/GYN PGY-1  2024 2:15  PM

## 2024-01-18 NOTE — PLAN OF CARE
Problem: POSTPARTUM  Goal: Experiences normal postpartum course  Description: INTERVENTIONS:  - Monitor maternal vital signs  - Assess uterine involution and lochia  Outcome: Progressing  Goal: Appropriate maternal -  bonding  Description: INTERVENTIONS:  - Identify family support  - Assess for appropriate maternal/infant bonding   -Encourage maternal/infant bonding opportunities  - Referral to  or  as needed  Outcome: Progressing  Goal: Establishment of infant feeding pattern  Description: INTERVENTIONS:  - Assess breast/bottle feeding  - Refer to lactation as needed  Outcome: Progressing  Goal: Incision(s), wounds(s) or drain site(s) healing without S/S of infection  Description: INTERVENTIONS  - Assess and document dressing, incision, wound bed, drain sites and surrounding tissue  - Provide patient and family education  - Perform skin care/dressing changes every   Outcome: Progressing     Problem: PAIN - ADULT  Goal: Verbalizes/displays adequate comfort level or baseline comfort level  Description: Interventions:  - Encourage patient to monitor pain and request assistance  - Assess pain using appropriate pain scale  - Administer analgesics based on type and severity of pain and evaluate response  - Implement non-pharmacological measures as appropriate and evaluate response  - Consider cultural and social influences on pain and pain management  - Notify physician/advanced practitioner if interventions unsuccessful or patient reports new pain  Outcome: Progressing     Problem: SAFETY ADULT  Goal: Patient will remain free of falls  Description: INTERVENTIONS:  - Educate patient/family on patient safety including physical limitations  - Instruct patient to call for assistance with activity   - Consult OT/PT to assist with strengthening/mobility   - Keep Call bell within reach  - Keep bed low and locked with side rails adjusted as appropriate  - Keep care items and personal belongings  within reach  - Initiate and maintain comfort rounds  - Make Fall Risk Sign visible to staff  - Offer Toileting every  Hours, in advance of need  - Initiate/Maintain alarm  - Obtain necessary fall risk management equipment:   - Apply yellow socks and bracelet for high fall risk patients  - Consider moving patient to room near nurses station  Outcome: Progressing  Goal: Maintain or return to baseline ADL function  Description: INTERVENTIONS:  -  Assess patient's ability to carry out ADLs; assess patient's baseline for ADL function and identify physical deficits which impact ability to perform ADLs (bathing, care of mouth/teeth, toileting, grooming, dressing, etc.)  - Assess/evaluate cause of self-care deficits   - Assess range of motion  - Assess patient's mobility; develop plan if impaired  - Assess patient's need for assistive devices and provide as appropriate  - Encourage maximum independence but intervene and supervise when necessary  - Involve family in performance of ADLs  - Assess for home care needs following discharge   - Consider OT consult to assist with ADL evaluation and planning for discharge  - Provide patient education as appropriate  Outcome: Progressing  Goal: Maintains/Returns to pre admission functional level  Description: INTERVENTIONS:  - Perform AM-PAC 6 Click Basic Mobility/ Daily Activity assessment daily.  - Set and communicate daily mobility goal to care team and patient/family/caregiver.   - Collaborate with rehabilitation services on mobility goals if consulted  - Perform Range of Motion  times a day.  - Reposition patient every hours.  - Dangle patient  times a day  - Stand patient  times a day  - Ambulate patient  times a day  - Out of bed to chair  times a day   - Out of bed for meals times a day  - Out of bed for toileting  - Record patient progress and toleration of activity level   Outcome: Progressing     Problem: DISCHARGE PLANNING  Goal: Discharge to home or other facility with  appropriate resources  Description: INTERVENTIONS:  - Identify barriers to discharge w/patient and caregiver  - Arrange for needed discharge resources and transportation as appropriate  - Identify discharge learning needs (meds, wound care, etc.)  - Arrange for interpretive services to assist at discharge as needed  - Refer to Case Management Department for coordinating discharge planning if the patient needs post-hospital services based on physician/advanced practitioner order or complex needs related to functional status, cognitive ability, or social support system  Outcome: Progressing     Problem: Knowledge Deficit  Goal: Patient/family/caregiver demonstrates understanding of disease process, treatment plan, medications, and discharge instructions  Description: Complete learning assessment and assess knowledge base.  Interventions:  - Provide teaching at level of understanding  - Provide teaching via preferred learning methods  Outcome: Progressing     Problem: ALTERATION IN THE BREAST  Goal: Optimize infant feeding at the breast  Description: INTERVENTIONS:  - Latch, breast and nipple assessment  - Assess prior breast feeding history  - Hand expression of breast milk  - For cracked, bleeding and or sore nipples reassess latch, treat damaged nipple  -Educate mother on feeding cues  -Positioning/latch techniques  Outcome: Progressing     Problem: INADEQUATE LATCH, SUCK OR SWALLOW  Goal: Demonstrate ability to latch and sustain latch, audible swallowing and satiety  Description: INTERVENTIONS:  - Assess oral anatomy, notify Physician/AP for abnormal findings  - Establish milk expression  - Maximize feeding opportunity (skin to skin, behavioral state)  - Position/latch techniques  - Discourage use of pacifier-artificial nipple  - Mechanical pumping  - Nipple Shield  - Supplemental formula feeding (Physician/AP order)  - Alternative feeding method  Outcome: Progressing

## 2024-01-18 NOTE — PLAN OF CARE
Problem: BIRTH - VAGINAL/ SECTION  Goal: Fetal and maternal status remain reassuring during the birth process  Description: INTERVENTIONS:  - Monitor vital signs  - Monitor fetal heart rate  - Monitor uterine activity  - Monitor labor progression (vaginal delivery)  - DVT prophylaxis if applicable   - Antibiotic prophylaxis if applicable   Outcome: Progressing  Goal: Emotionally satisfying birthing experience for mother/fetus  Description: Interventions:  - Assess, plan, implement and evaluate the nursing care given to the patient in labor  - Advocate the philosophy that each childbirth experience is a unique experience and support the family's chosen level of involvement and control during the labor process   - Actively participate in both the patient's and family's teaching of the birth process  - Consider cultural, Restorationist and age-specific factors and plan care for the patient in labor  Outcome: Progressing     Problem: Knowledge Deficit  Goal: Verbalizes understanding of labor plan  Description: Assess patient/family/caregiver's baseline knowledge level and ability to understand information.  Provide education via patient/family/caregiver's preferred learning method at appropriate level of understanding.     1. Provide teaching at level of understanding.  2. Provide teaching via preferred learning method(s).  Outcome: Progressing  Goal: Patient/family/caregiver demonstrates understanding of disease process, treatment plan, medications, and discharge instructions  Description: Complete learning assessment and assess knowledge base.  Interventions:  - Provide teaching at level of understanding  - Provide teaching via preferred learning methods  Outcome: Progressing     Problem: Labor & Delivery  Goal: Manages discomfort  Description: Assess and monitor for signs and symptoms of discomfort.  Assess patient's pain level regularly and per hospital policy.  Administer medications as ordered. Support use of  nonpharmacological methods to help control pain such as distraction, imagery, relaxation, and application of heat and cold.  Collaborate with interdisciplinary team and patient to determine appropriate pain management plan.    1. Include patient in decisions related to comfort.  2. Offer non-pharmacological pain management interventions.  3. Report ineffective pain management to physician.  Outcome: Progressing  Goal: Patient vital signs are stable  Description: 1. Assess vital signs - vaginal delivery.  Outcome: Progressing     Problem: PAIN - ADULT  Goal: Verbalizes/displays adequate comfort level or baseline comfort level  Description: Interventions:  - Encourage patient to monitor pain and request assistance  - Assess pain using appropriate pain scale  - Administer analgesics based on type and severity of pain and evaluate response  - Implement non-pharmacological measures as appropriate and evaluate response  - Consider cultural and social influences on pain and pain management  - Notify physician/advanced practitioner if interventions unsuccessful or patient reports new pain  Outcome: Progressing     Problem: INFECTION - ADULT  Goal: Absence or prevention of progression during hospitalization  Description: INTERVENTIONS:  - Assess and monitor for signs and symptoms of infection  - Monitor lab/diagnostic results  - Monitor all insertion sites, i.e. indwelling lines, tubes, and drains    - [place] appropriate cooling/warming therapies per order  - Administer medications as ordered  - Instruct and encourage patient and family to use good hand hygiene technique  - Identify and instruct in appropriate isolation precautions for identified infection/condition  Outcome: Progressing  Goal: Absence of fever/infection during neutropenic period  Description: INTERVENTIONS:  - Monitor WBC as ordered     Outcome: Progressing     Problem: SAFETY ADULT  Goal: Patient will remain free of falls  Description: INTERVENTIONS:  -  Educate patient/family on patient safety including physical limitations  - Instruct patient to call for assistance with activity   - Consult OT/PT to assist with strengthening/mobility   - Keep Call bell within reach  - Keep bed low and locked with side rails adjusted as appropriate  - Keep care items and personal belongings within reach  - Initiate and maintain comfort rounds  - Make Fall Risk Sign visible to staff    - Apply yellow socks and bracelet for high fall risk patients  - Consider moving patient to room near nurses station  Outcome: Progressing  Goal: Maintain or return to baseline ADL function  Description: INTERVENTIONS:  -  Assess patient's ability to carry out ADLs; assess patient's baseline for ADL function and identify physical deficits which impact ability to perform ADLs (bathing, care of mouth/teeth, toileting, grooming, dressing, etc.)  - Assess/evaluate cause of self-care deficits   - Assess range of motion  - Assess patient's mobility; develop plan if impaired  - Assess patient's need for assistive devices and provide as appropriate  - Encourage maximum independence but intervene and supervise when necessary  - Involve family in performance of ADLs  - Assess for home care needs following discharge   - Consider OT consult to assist with ADL evaluation and planning for discharge  - Provide patient education as appropriate  Outcome: Progressing  Goal: Maintains/Returns to pre admission functional level  Description: INTERVENTIONS:  - Perform AM-PAC 6 Click Basic Mobility/ Daily Activity assessment daily.  - Set and communicate daily mobility goal to care team and patient/family/caregiver.   - Collaborate with rehabilitation services on mobility goals if consulted    - Out of bed for toileting  - Record patient progress and toleration of activity level   Outcome: Progressing     Problem: DISCHARGE PLANNING  Goal: Discharge to home or other facility with appropriate resources  Description:  INTERVENTIONS:  - Identify barriers to discharge w/patient and caregiver  - Arrange for needed discharge resources and transportation as appropriate  - Identify discharge learning needs (meds, wound care, etc.)    - Refer to Case Management Department for coordinating discharge planning if the patient needs post-hospital services based on physician/advanced practitioner order or complex needs related to functional status, cognitive ability, or social support system  Outcome: Progressing

## 2024-01-18 NOTE — OB LABOR/OXYTOCIN SAFETY PROGRESS
Labor Progress Note - Chikis Amador 38 y.o. female MRN: [id number]    Unit/Bed#: - Encounter: [id number]    Dose (filippo-units/min) Oxytocin: 0 filippo-units/min (per Dr. Almanzar)  Contraction Frequency (minutes): 2-3  Contraction Intensity: Moderate  Uterine Activity Characteristics: Irritability  Cervical Dilation: 5-6        Cervical Effacement: 90  Fetal Station: -1  Baseline Rate (FHR): 135 bpm  Fetal Heart Rate (FHT): 152 BPM  FHR Category: cat ii for non-recurrent variables           Vital Signs:   Vitals:    24 0743   BP: (!) 89/55   Pulse: 59   Resp:    Temp:    SpO2:        Notes/comments:   Reviewed recommendation for pitocin break given that she has been on pitocin of 30 since midnight  Recommend break to allow for re-sensitization of oxytocin receptors  Reviewed baby feels asynclitic, plan to reposition and peanut ball  Reviewed once active would expect progression within 4-6 hours, if not occurring or if concern for fetal distress may recommend   Expressed understanding, would like to continue induction for now if possible    Lamar Almanzar MD 2024 7:56 AM

## 2024-01-18 NOTE — ANESTHESIA PREPROCEDURE EVALUATION
Procedure:   SECTION () (Uterus)    Relevant Problems   ENDO   (+) Hypothyroid in pregnancy, antepartum, third trimester      GYN   (+) Multigravida of advanced maternal age in third trimester        Physical Exam    Airway      TM Distance: >3 FB  Neck ROM: full     Dental       Cardiovascular      Pulmonary      Other Findings  post-pubertal.      Anesthesia Plan  ASA Score- 2 Emergent    Anesthesia Type- epidural with ASA Monitors.         Additional Monitors:     Airway Plan:            Plan Factors-Exercise tolerance (METS): >4 METS.    Chart reviewed.   Existing labs reviewed. Patient summary reviewed.                  Induction-     Postoperative Plan-     Informed Consent- Anesthetic plan and risks discussed with patient.  I personally reviewed this patient with the CRNA. Discussed and agreed on the Anesthesia Plan with the CRNA..

## 2024-01-19 LAB
ALBUMIN SERPL BCP-MCNC: 2.7 G/DL (ref 3.5–5)
ALP SERPL-CCNC: 100 U/L (ref 34–104)
ALT SERPL W P-5'-P-CCNC: 8 U/L (ref 7–52)
ANION GAP SERPL CALCULATED.3IONS-SCNC: 5 MMOL/L
AST SERPL W P-5'-P-CCNC: 21 U/L (ref 13–39)
BILIRUB SERPL-MCNC: 0.18 MG/DL (ref 0.2–1)
BUN SERPL-MCNC: 15 MG/DL (ref 5–25)
CALCIUM ALBUM COR SERPL-MCNC: 9.6 MG/DL (ref 8.3–10.1)
CALCIUM SERPL-MCNC: 8.6 MG/DL (ref 8.4–10.2)
CHLORIDE SERPL-SCNC: 109 MMOL/L (ref 96–108)
CO2 SERPL-SCNC: 23 MMOL/L (ref 21–32)
CREAT SERPL-MCNC: 0.72 MG/DL (ref 0.6–1.3)
ERYTHROCYTE [DISTWIDTH] IN BLOOD BY AUTOMATED COUNT: 13.6 % (ref 11.6–15.1)
ERYTHROCYTE [DISTWIDTH] IN BLOOD BY AUTOMATED COUNT: 13.8 % (ref 11.6–15.1)
GFR SERPL CREATININE-BSD FRML MDRD: 105 ML/MIN/1.73SQ M
GLUCOSE SERPL-MCNC: 84 MG/DL (ref 65–140)
HCT VFR BLD AUTO: 34.4 % (ref 34.8–46.1)
HCT VFR BLD AUTO: 39.1 % (ref 34.8–46.1)
HGB BLD-MCNC: 11.3 G/DL (ref 11.5–15.4)
HGB BLD-MCNC: 12.4 G/DL (ref 11.5–15.4)
MCH RBC QN AUTO: 29.1 PG (ref 26.8–34.3)
MCH RBC QN AUTO: 30.5 PG (ref 26.8–34.3)
MCHC RBC AUTO-ENTMCNC: 31.7 G/DL (ref 31.4–37.4)
MCHC RBC AUTO-ENTMCNC: 32.8 G/DL (ref 31.4–37.4)
MCV RBC AUTO: 92 FL (ref 82–98)
MCV RBC AUTO: 93 FL (ref 82–98)
PLATELET # BLD AUTO: 183 THOUSANDS/UL (ref 149–390)
PLATELET # BLD AUTO: 203 THOUSANDS/UL (ref 149–390)
PMV BLD AUTO: 10.3 FL (ref 8.9–12.7)
PMV BLD AUTO: 10.5 FL (ref 8.9–12.7)
POTASSIUM SERPL-SCNC: 4.2 MMOL/L (ref 3.5–5.3)
PROT SERPL-MCNC: 5.2 G/DL (ref 6.4–8.4)
RBC # BLD AUTO: 3.7 MILLION/UL (ref 3.81–5.12)
RBC # BLD AUTO: 4.26 MILLION/UL (ref 3.81–5.12)
SODIUM SERPL-SCNC: 137 MMOL/L (ref 135–147)
WBC # BLD AUTO: 11.78 THOUSAND/UL (ref 4.31–10.16)
WBC # BLD AUTO: 17.79 THOUSAND/UL (ref 4.31–10.16)

## 2024-01-19 PROCEDURE — 85027 COMPLETE CBC AUTOMATED: CPT

## 2024-01-19 PROCEDURE — 99024 POSTOP FOLLOW-UP VISIT: CPT | Performed by: OBSTETRICS & GYNECOLOGY

## 2024-01-19 PROCEDURE — 80053 COMPREHEN METABOLIC PANEL: CPT

## 2024-01-19 RX ORDER — LEVOTHYROXINE SODIUM 0.05 MG/1
50 TABLET ORAL
Status: DISCONTINUED | OUTPATIENT
Start: 2024-01-20 | End: 2024-01-21 | Stop reason: HOSPADM

## 2024-01-19 RX ADMIN — SODIUM CHLORIDE, SODIUM LACTATE, POTASSIUM CHLORIDE, AND CALCIUM CHLORIDE 125 ML/HR: .6; .31; .03; .02 INJECTION, SOLUTION INTRAVENOUS at 03:34

## 2024-01-19 RX ADMIN — LEVOTHYROXINE SODIUM 100 MCG: 100 TABLET ORAL at 05:21

## 2024-01-19 RX ADMIN — ACETAMINOPHEN 650 MG: 325 TABLET, FILM COATED ORAL at 12:31

## 2024-01-19 RX ADMIN — KETOROLAC TROMETHAMINE 30 MG: 30 INJECTION, SOLUTION INTRAMUSCULAR; INTRAVENOUS at 08:47

## 2024-01-19 RX ADMIN — KETOROLAC TROMETHAMINE 30 MG: 30 INJECTION, SOLUTION INTRAMUSCULAR; INTRAVENOUS at 15:23

## 2024-01-19 RX ADMIN — SIMETHICONE 80 MG: 80 TABLET, CHEWABLE ORAL at 22:23

## 2024-01-19 RX ADMIN — ENOXAPARIN SODIUM 40 MG: 40 INJECTION SUBCUTANEOUS at 08:47

## 2024-01-19 RX ADMIN — SENNOSIDES 8.6 MG: 8.6 TABLET, FILM COATED ORAL at 08:48

## 2024-01-19 RX ADMIN — SODIUM CHLORIDE, SODIUM LACTATE, POTASSIUM CHLORIDE, AND CALCIUM CHLORIDE 1000 ML: .6; .31; .03; .02 INJECTION, SOLUTION INTRAVENOUS at 22:23

## 2024-01-19 RX ADMIN — IBUPROFEN 600 MG: 600 TABLET ORAL at 22:23

## 2024-01-19 RX ADMIN — ACETAMINOPHEN 650 MG: 325 TABLET, FILM COATED ORAL at 17:45

## 2024-01-19 RX ADMIN — KETOROLAC TROMETHAMINE 30 MG: 30 INJECTION, SOLUTION INTRAMUSCULAR; INTRAVENOUS at 01:51

## 2024-01-19 RX ADMIN — ACETAMINOPHEN 650 MG: 325 TABLET, FILM COATED ORAL at 05:22

## 2024-01-19 NOTE — PROGRESS NOTES
Progress Note - OB/GYN   Chikis Amador 38 y.o. female MRN: [id number]  Unit/Bed#: -01 Encounter: [id number]      Assessment/Plan    Chikis Amador is a 38 y.o.  who is POD #1 s/p 1LTCS at 38w1d     * S/P primary low transverse   Assessment & Plan  Routine postpartum care  Encourage ambulation  Encourage familial bonding  Lactation support as needed  Pain: Motrin/Tylenol around the clock, oxycodone if needed  Postpartum Contraceptive plan: declines  Pre-delivery Hgb 12.3 --> 12.4  Johnson catheter: passed VT  DVT Ppx: ambulation, SCDs, Lovenox 40mg    Uterine fibroid in pregnancy  Assessment & Plan  1.  myometrium fibroid located in the anterior corpus region, measuring  1.9 x 0.9 x 1.7cm with a volume of 1.5cc. Color doppler flow was not  increased. The appearance was hypoechoic.     2.  myometrium fibroid located in the right posterior corpus region,  measuring 4.6 x 4.0 x 4.9cm with a volume of 47.2cc. Color doppler flow  was not increased. The appearance was heterogeneous.       Hypothyroid in pregnancy, antepartum, third trimester  Assessment & Plan  Levothyroxine 100 mcg in the am           Disposition: Anticipate discharge home postpartum Day #2-4        Subjective/Objective     Subjective: Postpartum state    Pain: no  Tolerating PO: yes  Voiding: yes  Flatus: yes  BM: no  Ambulating: yes  Breastfeeding: Breastfeeding  Chest pain: no  Shortness of breath: no  Leg pain: no  Lochia: wnl    Objective:     Vitals:  Vitals:    24 1816 24 1947 24 0001 24 0421   BP: 115/57 123/60 105/58 (!) 87/55   BP Location: Left arm Left arm  Left arm   Pulse: 55 (!) 47 (!) 51 (!) 48   Resp:    Temp: 98.1 °F (36.7 °C) 97.7 °F (36.5 °C) 98.2 °F (36.8 °C) 98.3 °F (36.8 °C)   TempSrc: Oral Oral Oral Oral   SpO2: 98% 98% 100% 98%   Weight:       Height:           Physical Exam:   GEN: appears well, alert and oriented x 3, pleasant and cooperative   CV: Regular rate  RESP: non  labored breathing  ABDOMEN: soft, no tenderness, no distention, Uterine fundus firm and non-tender, -1 cm below the umbilicus, incision c/d/i  EXTREMITIES: non-tender  NEURO Alert and oriented to person, place, and time.       Lab Results   Component Value Date    WBC 17.79 (H) 01/19/2024    HGB 12.4 01/19/2024    HCT 39.1 01/19/2024    MCV 92 01/19/2024     01/19/2024         Addis Mares DO  Obstetrics & Gynecology  01/19/24

## 2024-01-19 NOTE — LACTATION NOTE
This note was copied from a baby's chart.  CONSULT - LACTATION  Baby Boy (Jules Amador 0 days male MRN: [id number]    [facility] AN NURSERY Room / Bed: (N)/(N) Encounter: [id number]    Maternal Information     MOTHER:  Chikis Amador  Maternal Age: 38 y.o.   OB History: # 1 - Date: , Sex: None, Weight: None, GA: None, Delivery: None, Apgar1: None, Apgar5: None, Living: None, Birth Comments: None    # 2 - Date: , Sex: None, Weight: None, GA: None, Delivery: None, Apgar1: None, Apgar5: None, Living: None, Birth Comments: None    # 3 - Date: 11, Sex: Female, Weight: None, GA: 40w0d, Delivery: Vaginal, Spontaneous, Apgar1: None, Apgar5: None, Living: Living, Birth Comments: None    # 4 - Date: 24, Sex: Male, Weight: 3629 g (8 lb), GA: 38w1d, Delivery: , Low Transverse, Apgar1: 8, Apgar5: 9, Living: Living, Birth Comments: None   Previouse breast reduction surgery? No, Pt had B/L breast implants behind muscle in .     Lactation history:   Has patient previously breast fed: No   How long had patient previously breast fed:     Previous breast feeding complications:       Past Surgical History:   Procedure Laterality Date    BREAST IMPLANT          Birth information:  YOB: 2025   Time of birth: 1:34 PM   Sex: male   Delivery type: , Low Transverse   Birth Weight: 3629 g (8 lb)   Percent of Weight Change: 0%     Gestational Age: 38w1d   [unfilled]    Assessment     Breast and nipple assessment: normal assessment    [place] Assessment: normal assessment,no digital oral exam performed    Feeding assessment: feeding well  LATCH:  Latch: Grasps breast, tongue down, lips flanged, rhythmic sucking   Audible Swallowing: A few with stimulation   Type of Nipple: Everted (After stimulation)   Comfort (Breast/Nipple): Soft/non-tender   Hold (Positioning): Partial assist, teach one side, mother does other, staff holds   LATCH Score:  8          Feeding recommendations:  breast feed on demand  Upon start of consult, father burping baby after baby fed with 20mL of similac via bottle due to mother not feeling well. RSB & DC booklets provided and reviewed. Mother concerned with if she'll have enough milk. Demonstrated HE with teach-back. Mother reports baby latched on well in the recovery room, and would like additional tips for obtaining latch. Baby brought to mother and assisted into cross-cradle position on left breast. Educated mother to plant baby's chin outside of areola and ensure baby's belly is to her belly and ear, shoulders and hips are in line. Baby obtained deep latch. Reviewed breast compression when baby becomes sleepy at the breast. Baby remained latched for 5 minutes and unlatched self with hands relaxed. Reviewed offering both breast with each feeding every 2-3 hours.     Mother interested in utilizing formula as needed, educated parents that recommendation is to wait to offer bottle until breastfeeding is established around 1 month. Educated parents to choose liquid formula if supplementing to reduce risk of harmful bacteria. Reviewed selecting bottles/pacifiers that resemble shape of mothers breasts and nipples. Educated parents on paced bottle feeding.     Mother reports being really engorged with first pregnancy which caused her nipples to become flat. Mother asked if she could utilize nipple shields for this time. Educated mother that she can perform HE in a warm shower or pump if needed for comfort and that nipple shields are not indicated for this. Mother has lanolin at bedside, educated on use.       Reviewed pump cycles on Spectra S1, and how to store milk with mother and father.   Encouraged parents to call lactation for support.     Information on hand expression given. Discussed benefits of knowing how to manually express breast including stimulating milk supply, softening nipple for latch and evacuating breast in the  event of engorgement.    Mom is encouraged to place baby skin to skin for feedings. Skin to skin education provided for baby placement on mother's chest, baby only in diaper, blankets below shoulders on baby's back. Skin to skin is encouraged to continue at home for feedings and between feedings.    Worked on positioning infant up at chest level and starting to feed infant with nose arriving at the nipple. Then, using areolar compression to achieve a deep latch that is comfortable and exchanges optimum amounts of milk.     - Start feedings on breast that last feeding ended   - allow no more than 3 hours between breast feeding sessions   - time between feedings is counted from the beginning of the first feed to the beginning of the next feeding session    Reviewed early signs of hunger, including tensing of hands and shoulders - no need to wait for open eyes.  Crying is a late hunger sign.  If baby is crying, soothe baby first and then attempt to latch.  Reviewed normal sucking patterns: transition from stimulation to nutritive to release or non-nutritive. The goal is to see and hear lots of swallowing.    Reviewed normal nursing pattern: infant could latch on one breast up to 30 minutes or until releases on own. Signs of satiation is open hand with fingers that do not grab your finger.  Discussed difference in sensation of non-nutritive v nutritive sucking      Discussed Skin to Skin contact an benefits to mom and baby.  Talked about the delay of the first bath until baby has adjusted. Spoke about the benefits of rooming in. Feeding on cue and what that means for recognizing infant's hunger. Avoidance of pacifiers for the first month discussed. Talked about exclusive breastfeeding for the first 6 months.    Positioning and latch reviewed as well as showing images of other feeding positions.  Discussed the properties of a good latch in any position. Reviewed hand/manual expression.  Discussed s/s that baby is getting  enough milk and some s/s that breastfeeding dyad may need further help.    Gave information on common concerns, what to expect the first few weeks after delivery, preparing for other caregivers, and how partners can help. Resources for support also provided.  Provided education on growth spurts, when to introduce bottles; paced bottle feeding, and non-nutritive suck at the breast. Provided education on Signs of satiation. Encouraged to call lactation to observe a latch prior to discharge for reassurance. Encouraged to call baby and me with any questions and closely monitor output.      Yessi Erickson RN 1/18/2024 9:31 PM

## 2024-01-19 NOTE — PLAN OF CARE
Problem: POSTPARTUM  Goal: Experiences normal postpartum course  Description: INTERVENTIONS:  - Monitor maternal vital signs  - Assess uterine involution and lochia  Outcome: Progressing  Goal: Appropriate maternal -  bonding  Description: INTERVENTIONS:  - Identify family support  - Assess for appropriate maternal/infant bonding   -Encourage maternal/infant bonding opportunities  - Referral to  or  as needed  Outcome: Progressing  Goal: Establishment of infant feeding pattern  Description: INTERVENTIONS:  - Assess breast/bottle feeding  - Refer to lactation as needed  Outcome: Progressing  Goal: Incision(s), wounds(s) or drain site(s) healing without S/S of infection  Description: INTERVENTIONS  - Assess and document dressing, incision, wound bed, drain sites and surrounding tissue  - Provide patient and family education  Outcome: Progressing     Problem: PAIN - ADULT  Goal: Verbalizes/displays adequate comfort level or baseline comfort level  Description: Interventions:  - Encourage patient to monitor pain and request assistance  - Assess pain using appropriate pain scale  - Administer analgesics based on type and severity of pain and evaluate response  - Implement non-pharmacological measures as appropriate and evaluate response  - Consider cultural and social influences on pain and pain management  - Notify physician/advanced practitioner if interventions unsuccessful or patient reports new pain  Outcome: Progressing     Problem: SAFETY ADULT  Goal: Patient will remain free of falls  Description: INTERVENTIONS:  - Educate patient/family on patient safety including physical limitations  - Instruct patient to call for assistance with activity   - Consult OT/PT to assist with strengthening/mobility   - Keep Call bell within reach  - Keep bed low and locked with side rails adjusted as appropriate  - Keep care items and personal belongings within reach  - Initiate and maintain  comfort rounds  - Make Fall Risk Sign visible to staff  - Apply yellow socks and bracelet for high fall risk patients  - Consider moving patient to room near nurses station  Outcome: Progressing  Goal: Maintain or return to baseline ADL function  Description: INTERVENTIONS:  -  Assess patient's ability to carry out ADLs; assess patient's baseline for ADL function and identify physical deficits which impact ability to perform ADLs (bathing, care of mouth/teeth, toileting, grooming, dressing, etc.)  - Assess/evaluate cause of self-care deficits   - Assess range of motion  - Assess patient's mobility; develop plan if impaired  - Assess patient's need for assistive devices and provide as appropriate  - Encourage maximum independence but intervene and supervise when necessary  - Involve family in performance of ADLs  - Assess for home care needs following discharge   - Consider OT consult to assist with ADL evaluation and planning for discharge  - Provide patient education as appropriate  Outcome: Progressing  Goal: Maintains/Returns to pre admission functional level  Description: INTERVENTIONS:  - Perform AM-PAC 6 Click Basic Mobility/ Daily Activity assessment daily.  - Set and communicate daily mobility goal to care team and patient/family/caregiver.   - Collaborate with rehabilitation services on mobility goals if consulted  - Out of bed for toileting  - Record patient progress and toleration of activity level   Outcome: Progressing     Problem: DISCHARGE PLANNING  Goal: Discharge to home or other facility with appropriate resources  Description: INTERVENTIONS:  - Identify barriers to discharge w/patient and caregiver  - Arrange for needed discharge resources and transportation as appropriate  - Identify discharge learning needs (meds, wound care, etc.)  - Arrange for interpretive services to assist at discharge as needed  - Refer to Case Management Department for coordinating discharge planning if the patient needs  post-hospital services based on physician/advanced practitioner order or complex needs related to functional status, cognitive ability, or social support system  Outcome: Progressing     Problem: Knowledge Deficit  Goal: Patient/family/caregiver demonstrates understanding of disease process, treatment plan, medications, and discharge instructions  Description: Complete learning assessment and assess knowledge base.  Interventions:  - Provide teaching at level of understanding  - Provide teaching via preferred learning methods  Outcome: Progressing     Problem: ALTERATION IN THE BREAST  Goal: Optimize infant feeding at the breast  Description: INTERVENTIONS:  - Latch, breast and nipple assessment  - Assess prior breast feeding history  - Hand expression of breast milk  - For cracked, bleeding and or sore nipples reassess latch, treat damaged nipple  -Educate mother on feeding cues  -Positioning/latch techniques  Outcome: Progressing     Problem: INADEQUATE LATCH, SUCK OR SWALLOW  Goal: Demonstrate ability to latch and sustain latch, audible swallowing and satiety  Description: INTERVENTIONS:  - Assess oral anatomy, notify Physician/AP for abnormal findings  - Establish milk expression  - Maximize feeding opportunity (skin to skin, behavioral state)  - Position/latch techniques  - Discourage use of pacifier-artificial nipple  - Mechanical pumping  - Nipple Shield  - Supplemental formula feeding (Physician/AP order)  - Alternative feeding method  Outcome: Progressing

## 2024-01-19 NOTE — PLAN OF CARE
Problem: PAIN - ADULT  Goal: Verbalizes/displays adequate comfort level or baseline comfort level  Description: Interventions:  - Encourage patient to monitor pain and request assistance  - Assess pain using appropriate pain scale  - Administer analgesics based on type and severity of pain and evaluate response  - Implement non-pharmacological measures as appropriate and evaluate response  - Consider cultural and social influences on pain and pain management  - Notify physician/advanced practitioner if interventions unsuccessful or patient reports new pain  Outcome: Progressing     Problem: POSTPARTUM  Goal: Experiences normal postpartum course  Description: INTERVENTIONS:  - Monitor maternal vital signs  - Assess uterine involution and lochia  Outcome: Progressing  Goal: Appropriate maternal -  bonding  Description: INTERVENTIONS:  - Identify family support  - Assess for appropriate maternal/infant bonding   -Encourage maternal/infant bonding opportunities  - Referral to  or  as needed  Outcome: Progressing  Goal: Establishment of infant feeding pattern  Description: INTERVENTIONS:  - Assess breast/bottle feeding  - Refer to lactation as needed  Outcome: Progressing  Goal: Incision(s), wounds(s) or drain site(s) healing without S/S of infection  Description: INTERVENTIONS  - Assess and document dressing, incision, wound bed, drain sites and surrounding tissue  - Provide patient and family education  Outcome: Progressing     Problem: SAFETY ADULT  Goal: Patient will remain free of falls  Description: INTERVENTIONS:  - Educate patient/family on patient safety including physical limitations  - Instruct patient to call for assistance with activity   - Consult OT/PT to assist with strengthening/mobility   - Keep Call bell within reach  - Keep bed low and locked with side rails adjusted as appropriate  - Keep care items and personal belongings within reach  - Initiate and maintain  comfort rounds  - Make Fall Risk Sign visible to staff  - Apply yellow socks and bracelet for high fall risk patients  - Consider moving patient to room near nurses station  Outcome: Progressing  Goal: Maintain or return to baseline ADL function  Description: INTERVENTIONS:  -  Assess patient's ability to carry out ADLs; assess patient's baseline for ADL function and identify physical deficits which impact ability to perform ADLs (bathing, care of mouth/teeth, toileting, grooming, dressing, etc.)  - Assess/evaluate cause of self-care deficits   - Assess range of motion  - Assess patient's mobility; develop plan if impaired  - Assess patient's need for assistive devices and provide as appropriate  - Encourage maximum independence but intervene and supervise when necessary  - Involve family in performance of ADLs  - Assess for home care needs following discharge   - Consider OT consult to assist with ADL evaluation and planning for discharge  - Provide patient education as appropriate  Outcome: Progressing  Goal: Maintains/Returns to pre admission functional level  Description: INTERVENTIONS:  - Perform AM-PAC 6 Click Basic Mobility/ Daily Activity assessment daily.  - Set and communicate daily mobility goal to care team and patient/family/caregiver.   - Collaborate with rehabilitation services on mobility goals if consulted  - Out of bed for toileting  - Record patient progress and toleration of activity level   Outcome: Progressing     Problem: DISCHARGE PLANNING  Goal: Discharge to home or other facility with appropriate resources  Description: INTERVENTIONS:  - Identify barriers to discharge w/patient and caregiver  - Arrange for needed discharge resources and transportation as appropriate  - Identify discharge learning needs (meds, wound care, etc.)  - Arrange for interpretive services to assist at discharge as needed  - Refer to Case Management Department for coordinating discharge planning if the patient needs  post-hospital services based on physician/advanced practitioner order or complex needs related to functional status, cognitive ability, or social support system  Outcome: Progressing     Problem: Knowledge Deficit  Goal: Patient/family/caregiver demonstrates understanding of disease process, treatment plan, medications, and discharge instructions  Description: Complete learning assessment and assess knowledge base.  Interventions:  - Provide teaching at level of understanding  - Provide teaching via preferred learning methods  Outcome: Progressing     Problem: ALTERATION IN THE BREAST  Goal: Optimize infant feeding at the breast  Description: INTERVENTIONS:  - Latch, breast and nipple assessment  - Assess prior breast feeding history  - Hand expression of breast milk  - For cracked, bleeding and or sore nipples reassess latch, treat damaged nipple  -Educate mother on feeding cues  -Positioning/latch techniques  Outcome: Progressing     Problem: INADEQUATE LATCH, SUCK OR SWALLOW  Goal: Demonstrate ability to latch and sustain latch, audible swallowing and satiety  Description: INTERVENTIONS:  - Assess oral anatomy, notify Physician/AP for abnormal findings  - Establish milk expression  - Maximize feeding opportunity (skin to skin, behavioral state)  - Position/latch techniques  - Discourage use of pacifier-artificial nipple  - Mechanical pumping  - Nipple Shield  - Supplemental formula feeding (Physician/AP order)  - Alternative feeding method  Outcome: Progressing

## 2024-01-20 PROCEDURE — 99024 POSTOP FOLLOW-UP VISIT: CPT | Performed by: OBSTETRICS & GYNECOLOGY

## 2024-01-20 RX ADMIN — IBUPROFEN 600 MG: 600 TABLET ORAL at 03:30

## 2024-01-20 RX ADMIN — SIMETHICONE 80 MG: 80 TABLET, CHEWABLE ORAL at 20:05

## 2024-01-20 RX ADMIN — IBUPROFEN 600 MG: 600 TABLET ORAL at 22:42

## 2024-01-20 RX ADMIN — OXYCODONE HYDROCHLORIDE 5 MG: 5 TABLET ORAL at 22:42

## 2024-01-20 RX ADMIN — LEVOTHYROXINE SODIUM 50 MCG: 50 TABLET ORAL at 06:51

## 2024-01-20 RX ADMIN — SIMETHICONE 80 MG: 80 TABLET, CHEWABLE ORAL at 08:34

## 2024-01-20 RX ADMIN — OXYCODONE HYDROCHLORIDE 5 MG: 5 TABLET ORAL at 03:30

## 2024-01-20 RX ADMIN — IBUPROFEN 600 MG: 600 TABLET ORAL at 09:38

## 2024-01-20 RX ADMIN — OXYCODONE HYDROCHLORIDE 5 MG: 5 TABLET ORAL at 08:22

## 2024-01-20 RX ADMIN — ACETAMINOPHEN 650 MG: 325 TABLET, FILM COATED ORAL at 20:05

## 2024-01-20 RX ADMIN — ACETAMINOPHEN 650 MG: 325 TABLET, FILM COATED ORAL at 11:56

## 2024-01-20 RX ADMIN — ENOXAPARIN SODIUM 40 MG: 40 INJECTION SUBCUTANEOUS at 08:22

## 2024-01-20 RX ADMIN — ACETAMINOPHEN 650 MG: 325 TABLET, FILM COATED ORAL at 06:51

## 2024-01-20 RX ADMIN — IBUPROFEN 600 MG: 600 TABLET ORAL at 16:54

## 2024-01-20 RX ADMIN — OXYCODONE HYDROCHLORIDE 5 MG: 5 TABLET ORAL at 13:15

## 2024-01-20 RX ADMIN — OXYCODONE HYDROCHLORIDE 5 MG: 5 TABLET ORAL at 18:13

## 2024-01-20 RX ADMIN — SENNOSIDES 8.6 MG: 8.6 TABLET, FILM COATED ORAL at 08:22

## 2024-01-20 RX ADMIN — ACETAMINOPHEN 650 MG: 325 TABLET, FILM COATED ORAL at 00:11

## 2024-01-20 NOTE — PLAN OF CARE
Problem: PAIN - ADULT  Goal: Verbalizes/displays adequate comfort level or baseline comfort level  Description: Interventions:  - Encourage patient to monitor pain and request assistance  - Assess pain using appropriate pain scale  - Administer analgesics based on type and severity of pain and evaluate response  - Implement non-pharmacological measures as appropriate and evaluate response  - Consider cultural and social influences on pain and pain management  - Notify physician/advanced practitioner if interventions unsuccessful or patient reports new pain  Outcome: Progressing     Problem: POSTPARTUM  Goal: Experiences normal postpartum course  Description: INTERVENTIONS:  - Monitor maternal vital signs  - Assess uterine involution and lochia  Outcome: Progressing  Goal: Appropriate maternal -  bonding  Description: INTERVENTIONS:  - Identify family support  - Assess for appropriate maternal/infant bonding   -Encourage maternal/infant bonding opportunities  - Referral to  or  as needed  Outcome: Progressing  Goal: Establishment of infant feeding pattern  Description: INTERVENTIONS:  - Assess breast/bottle feeding  - Refer to lactation as needed  Outcome: Progressing  Goal: Incision(s), wounds(s) or drain site(s) healing without S/S of infection  Description: INTERVENTIONS  - Assess and document dressing, incision, wound bed, drain sites and surrounding tissue  - Provide patient and family education  - Perform skin care/dressing changes every   Outcome: Progressing     Problem: SAFETY ADULT  Goal: Patient will remain free of falls  Description: INTERVENTIONS:  - Educate patient/family on patient safety including physical limitations  - Instruct patient to call for assistance with activity   - Consult OT/PT to assist with strengthening/mobility   - Keep Call bell within reach  - Keep bed low and locked with side rails adjusted as appropriate  - Keep care items and personal belongings  within reach  - Initiate and maintain comfort rounds  - Make Fall Risk Sign visible to staff  - Offer Toileting every  Hours, in advance of need  - Initiate/Maintain alarm  - Obtain necessary fall risk management equipment:   - Apply yellow socks and bracelet for high fall risk patients  - Consider moving patient to room near nurses station  Outcome: Progressing  Goal: Maintain or return to baseline ADL function  Description: INTERVENTIONS:  -  Assess patient's ability to carry out ADLs; assess patient's baseline for ADL function and identify physical deficits which impact ability to perform ADLs (bathing, care of mouth/teeth, toileting, grooming, dressing, etc.)  - Assess/evaluate cause of self-care deficits   - Assess range of motion  - Assess patient's mobility; develop plan if impaired  - Assess patient's need for assistive devices and provide as appropriate  - Encourage maximum independence but intervene and supervise when necessary  - Involve family in performance of ADLs  - Assess for home care needs following discharge   - Consider OT consult to assist with ADL evaluation and planning for discharge  - Provide patient education as appropriate  Outcome: Progressing  Goal: Maintains/Returns to pre admission functional level  Description: INTERVENTIONS:  - Perform AM-PAC 6 Click Basic Mobility/ Daily Activity assessment daily.  - Set and communicate daily mobility goal to care team and patient/family/caregiver.   - Collaborate with rehabilitation services on mobility goals if consulted  - Perform Range of Motion  times a day.  - Reposition patient every  hours.  - Dangle patient  times a day  - Stand patient  times a day  - Ambulate patient  times a day  - Out of bed to chair  times a day   - Out of bed for meals times a day  - Out of bed for toileting  - Record patient progress and toleration of activity level   Outcome: Progressing     Problem: DISCHARGE PLANNING  Goal: Discharge to home or other facility with  appropriate resources  Description: INTERVENTIONS:  - Identify barriers to discharge w/patient and caregiver  - Arrange for needed discharge resources and transportation as appropriate  - Identify discharge learning needs (meds, wound care, etc.)  - Arrange for interpretive services to assist at discharge as needed  - Refer to Case Management Department for coordinating discharge planning if the patient needs post-hospital services based on physician/advanced practitioner order or complex needs related to functional status, cognitive ability, or social support system  Outcome: Progressing     Problem: Knowledge Deficit  Goal: Patient/family/caregiver demonstrates understanding of disease process, treatment plan, medications, and discharge instructions  Description: Complete learning assessment and assess knowledge base.  Interventions:  - Provide teaching at level of understanding  - Provide teaching via preferred learning methods  Outcome: Progressing     Problem: ALTERATION IN THE BREAST  Goal: Optimize infant feeding at the breast  Description: INTERVENTIONS:  - Latch, breast and nipple assessment  - Assess prior breast feeding history  - Hand expression of breast milk  - For cracked, bleeding and or sore nipples reassess latch, treat damaged nipple  -Educate mother on feeding cues  -Positioning/latch techniques  Outcome: Progressing     Problem: INADEQUATE LATCH, SUCK OR SWALLOW  Goal: Demonstrate ability to latch and sustain latch, audible swallowing and satiety  Description: INTERVENTIONS:  - Assess oral anatomy, notify Physician/AP for abnormal findings  - Establish milk expression  - Maximize feeding opportunity (skin to skin, behavioral state)  - Position/latch techniques  - Discourage use of pacifier-artificial nipple  - Mechanical pumping  - Nipple Shield  - Supplemental formula feeding (Physician/AP order)  - Alternative feeding method  Outcome: Progressing

## 2024-01-20 NOTE — PROGRESS NOTES
Progress Note - OB/GYN   Chikis Amador 38 y.o. female MRN: [id number]  Unit/Bed#: -01 Encounter: [id number]      Assessment/Plan    Chikis Amador is a 38 y.o.  who is POD #2 s/p 1LTCS at 38w1d     Uterine fibroid in pregnancy  Assessment & Plan  1.  myometrium fibroid located in the anterior corpus region, measuring  1.9 x 0.9 x 1.7cm with a volume of 1.5cc. Color doppler flow was not  increased. The appearance was hypoechoic.     2.  myometrium fibroid located in the right posterior corpus region,  measuring 4.6 x 4.0 x 4.9cm with a volume of 47.2cc. Color doppler flow  was not increased. The appearance was heterogeneous.       Hypothyroid in pregnancy, antepartum, third trimester  Assessment & Plan  Levothyroxine 100 mcg in the am    * S/P primary low transverse   Assessment & Plan  Routine postpartum care  Encourage ambulation  Encourage familial bonding  Lactation support as needed  Pain: Motrin/Tylenol around the clock, oxycodone if needed  Postpartum Contraceptive plan: declines  Pre-delivery Hgb 12.3 --> 12.4  Johnson catheter: passed VT  DVT Ppx: ambulation, SCDs, Lovenox 40mg           Disposition: Anticipate discharge home postpartum Day #3-4        Subjective/Objective     Subjective: Postpartum state    Pain: no  Tolerating PO: yes  Voiding: yes  Flatus: yes  BM: no  Ambulating: yes  Breastfeeding: Breastfeeding  Chest pain: no  Shortness of breath: no  Leg pain: no  Lochia: wnl  Her dizziness from overnight improved    Objective:     Vitals:  Vitals:    24 2358 24 2359 24 0002 24 0331   BP: 105/68 102/70 116/74 112/53   BP Location: Left arm Left arm Left arm Right arm   Pulse: (!) 53 (!) 51 57 (!) 51   Resp:   16 16   Temp:    (!) 97.3 °F (36.3 °C)   TempSrc:   Oral Oral   SpO2:   100%    Weight:       Height:           Physical Exam:   GEN: appears well, alert and oriented x 3, pleasant and cooperative   CV: Regular rate  RESP: non labored  breathing  ABDOMEN: soft, no tenderness, no distention, Uterine fundus firm and non-tender, -1 cm below the umbilicus, incision c/d/i  EXTREMITIES: non-tender  NEURO Alert and oriented to person, place, and time.       Lab Results   Component Value Date    WBC 11.78 (H) 01/19/2024    HGB 11.3 (L) 01/19/2024    HCT 34.4 (L) 01/19/2024    MCV 93 01/19/2024     01/19/2024         Leela De Luna MD  Obstetrics & Gynecology  01/20/24

## 2024-01-20 NOTE — PLAN OF CARE
Problem: POSTPARTUM  Goal: Experiences normal postpartum course  Description: INTERVENTIONS:  - Monitor maternal vital signs  - Assess uterine involution and lochia  Outcome: Progressing  Goal: Appropriate maternal -  bonding  Description: INTERVENTIONS:  - Identify family support  - Assess for appropriate maternal/infant bonding   -Encourage maternal/infant bonding opportunities  - Referral to  or  as needed  Outcome: Progressing  Goal: Establishment of infant feeding pattern  Description: INTERVENTIONS:  - Assess breast/bottle feeding  - Refer to lactation as needed  Outcome: Progressing  Goal: Incision(s), wounds(s) or drain site(s) healing without S/S of infection  Description: INTERVENTIONS  - Assess and document dressing, incision, wound bed, drain sites and surrounding tissue  - Provide patient and family education  Outcome: Progressing     Problem: PAIN - ADULT  Goal: Verbalizes/displays adequate comfort level or baseline comfort level  Description: Interventions:  - Encourage patient to monitor pain and request assistance  - Assess pain using appropriate pain scale  - Administer analgesics based on type and severity of pain and evaluate response  - Implement non-pharmacological measures as appropriate and evaluate response  - Consider cultural and social influences on pain and pain management  - Notify physician/advanced practitioner if interventions unsuccessful or patient reports new pain  Outcome: Progressing     Problem: SAFETY ADULT  Goal: Patient will remain free of falls  Description: INTERVENTIONS:  - Educate patient/family on patient safety including physical limitations  - Instruct patient to call for assistance with activity   - Consult OT/PT to assist with strengthening/mobility   - Keep Call bell within reach  - Keep bed low and locked with side rails adjusted as appropriate  - Keep care items and personal belongings within reach  - Initiate and maintain  comfort rounds  - Make Fall Risk Sign visible to staff  - Apply yellow socks and bracelet for high fall risk patients  - Consider moving patient to room near nurses station  Outcome: Progressing  Goal: Maintain or return to baseline ADL function  Description: INTERVENTIONS:  -  Assess patient's ability to carry out ADLs; assess patient's baseline for ADL function and identify physical deficits which impact ability to perform ADLs (bathing, care of mouth/teeth, toileting, grooming, dressing, etc.)  - Assess/evaluate cause of self-care deficits   - Assess range of motion  - Assess patient's mobility; develop plan if impaired  - Assess patient's need for assistive devices and provide as appropriate  - Encourage maximum independence but intervene and supervise when necessary  - Involve family in performance of ADLs  - Assess for home care needs following discharge   - Consider OT consult to assist with ADL evaluation and planning for discharge  - Provide patient education as appropriate  Outcome: Progressing  Goal: Maintains/Returns to pre admission functional level  Description: INTERVENTIONS:  - Perform AM-PAC 6 Click Basic Mobility/ Daily Activity assessment daily.  - Set and communicate daily mobility goal to care team and patient/family/caregiver.   - Collaborate with rehabilitation services on mobility goals if consulted  - Out of bed for toileting  - Record patient progress and toleration of activity level   Outcome: Progressing     Problem: DISCHARGE PLANNING  Goal: Discharge to home or other facility with appropriate resources  Description: INTERVENTIONS:  - Identify barriers to discharge w/patient and caregiver  - Arrange for needed discharge resources and transportation as appropriate  - Identify discharge learning needs (meds, wound care, etc.)  - Arrange for interpretive services to assist at discharge as needed  - Refer to Case Management Department for coordinating discharge planning if the patient needs  post-hospital services based on physician/advanced practitioner order or complex needs related to functional status, cognitive ability, or social support system  Outcome: Progressing     Problem: Knowledge Deficit  Goal: Patient/family/caregiver demonstrates understanding of disease process, treatment plan, medications, and discharge instructions  Description: Complete learning assessment and assess knowledge base.  Interventions:  - Provide teaching at level of understanding  - Provide teaching via preferred learning methods  Outcome: Progressing     Problem: ALTERATION IN THE BREAST  Goal: Optimize infant feeding at the breast  Description: INTERVENTIONS:  - Latch, breast and nipple assessment  - Assess prior breast feeding history  - Hand expression of breast milk  - For cracked, bleeding and or sore nipples reassess latch, treat damaged nipple  -Educate mother on feeding cues  -Positioning/latch techniques  Outcome: Progressing     Problem: ALTERATION IN THE BREAST  Goal: Optimize infant feeding at the breast  Description: INTERVENTIONS:  - Latch, breast and nipple assessment  - Assess prior breast feeding history  - Hand expression of breast milk  - For cracked, bleeding and or sore nipples reassess latch, treat damaged nipple  -Educate mother on feeding cues  -Positioning/latch techniques  Outcome: Progressing     Problem: INADEQUATE LATCH, SUCK OR SWALLOW  Goal: Demonstrate ability to latch and sustain latch, audible swallowing and satiety  Description: INTERVENTIONS:  - Assess oral anatomy, notify Physician/AP for abnormal findings  - Establish milk expression  - Maximize feeding opportunity (skin to skin, behavioral state)  - Position/latch techniques  - Discourage use of pacifier-artificial nipple  - Mechanical pumping  - Nipple Shield  - Supplemental formula feeding (Physician/AP order)  - Alternative feeding method  Outcome: Progressing

## 2024-01-20 NOTE — LACTATION NOTE
This note was copied from a baby's chart.     01/20/24 1135   Lactation Consultation   Reason for Consult 15 min;10 m   Lactation Consultant Total Time 25   Breasts/Nipples   Left Breast Filling;Soft   Right Breast Filling;Soft   Left Nipple Everted   Right Nipple Everted   Intervention Hand expression;Breast pump   Breastfeeding Progress Breastfeeding well   Other OB Lactation Tools   Feeding Devices Bottle;Lanolin   Breast Pump   Pump 3   Pump Review/Education Setup, frequency, and cleaning;Milk storage   Initiated by NIMA Zuniga RN   Date Initiated 01/20/24   Patient Follow-Up   Lactation Consult Status 1   Follow-Up Type Inpatient;Call as needed   Other OB Lactation Documentation    Additional Problem Noted Checked in with family due to potential discharge today. Mother stated she will be staying another day. Reviewed Skin to skin, deep latch and feeding cues. Mother stated that she would like pumping. Set up pump and did education on cleaning and milk storage.     Education on turning on the pump, press the 3 wavy lines to place pump on stimulation mode (high cycle, low vacuum) Set vacuum to comfort with light suction. After 3 min, press 3 wavy lines and change setting to Expression mode (low Cycle, High vacuum) Vacuum setting should not pinch, only tug the nipple. Now pump is set. Next time mom pumps, will only need to turn on pump and press 3 wavy line button to change cycle three times in a pumping session.     Mother stated that she was feeling tired and baby just had about 20 ml of formula. Encouraged mother to pump if she decides to supplement to make sure she stimulates her breast.     Discussed risks for early supplementation: over feeding, longer digestion times, engorgement for mom, lower milk supply for mom, and nipple confusion.     Benefits of breast feeding for infant's intestinal tract, less engorgement for mom, protection from multiple disease processes as infant develops, avoidance of over  feeding for infant, less nipple confusion, and increased health benefits for mom.    Encouraged mother to call as needed to check a latch. Mother stated latching has been going well and is not feeling any pain.     Encouraged parents to call for assistance, questions, and concerns about breastfeeding.  Extension provided.

## 2024-01-20 NOTE — PLAN OF CARE
Problem: PAIN - ADULT  Goal: Verbalizes/displays adequate comfort level or baseline comfort level  Description: Interventions:  - Encourage patient to monitor pain and request assistance  - Assess pain using appropriate pain scale  - Administer analgesics based on type and severity of pain and evaluate response  - Implement non-pharmacological measures as appropriate and evaluate response  - Consider cultural and social influences on pain and pain management  - Notify physician/advanced practitioner if interventions unsuccessful or patient reports new pain  2024 by Ashley Miranda RN  Outcome: Progressing  2024 by Ashley Miranda RN  Outcome: Progressing     Problem: POSTPARTUM  Goal: Experiences normal postpartum course  Description: INTERVENTIONS:  - Monitor maternal vital signs  - Assess uterine involution and lochia  2024 by Ashley Miranda RN  Outcome: Progressing  2024 by Ashley Miranda RN  Outcome: Progressing  Goal: Appropriate maternal -  bonding  Description: INTERVENTIONS:  - Identify family support  - Assess for appropriate maternal/infant bonding   -Encourage maternal/infant bonding opportunities  - Referral to  or  as needed  2024 by Ashley Miranda RN  Outcome: Progressing  2024 by Ashley Miranda RN  Outcome: Progressing  Goal: Establishment of infant feeding pattern  Description: INTERVENTIONS:  - Assess breast/bottle feeding  - Refer to lactation as needed  2024 by Ashley Miranda RN  Outcome: Progressing  2024 by Ashley Miranda RN  Outcome: Progressing  Goal: Incision(s), wounds(s) or drain site(s) healing without S/S of infection  Description: INTERVENTIONS  - Assess and document dressing, incision, wound bed, drain sites and surrounding tissue  - Provide patient and family education  - Perform skin care/dressing changes every   2024 by Ashley Miranda  RN  Outcome: Progressing  1/20/2024 0930 by Ashley Miranda RN  Outcome: Progressing     Problem: SAFETY ADULT  Goal: Patient will remain free of falls  Description: INTERVENTIONS:  - Educate patient/family on patient safety including physical limitations  - Instruct patient to call for assistance with activity   - Consult OT/PT to assist with strengthening/mobility   - Keep Call bell within reach  - Keep bed low and locked with side rails adjusted as appropriate  - Keep care items and personal belongings within reach  - Initiate and maintain comfort rounds  - Make Fall Risk Sign visible to staff  - Offer Toileting every  Hours, in advance of need  - Initiate/Maintain alarm  - Obtain necessary fall risk management equipment:   - Apply yellow socks and bracelet for high fall risk patients  - Consider moving patient to room near nurses station  1/20/2024 0930 by Ashley Miranda RN  Outcome: Progressing  1/20/2024 0930 by Ashley Miranda RN  Outcome: Progressing  Goal: Maintain or return to baseline ADL function  Description: INTERVENTIONS:  -  Assess patient's ability to carry out ADLs; assess patient's baseline for ADL function and identify physical deficits which impact ability to perform ADLs (bathing, care of mouth/teeth, toileting, grooming, dressing, etc.)  - Assess/evaluate cause of self-care deficits   - Assess range of motion  - Assess patient's mobility; develop plan if impaired  - Assess patient's need for assistive devices and provide as appropriate  - Encourage maximum independence but intervene and supervise when necessary  - Involve family in performance of ADLs  - Assess for home care needs following discharge   - Consider OT consult to assist with ADL evaluation and planning for discharge  - Provide patient education as appropriate  1/20/2024 0930 by Ashley Miranda RN  Outcome: Progressing  1/20/2024 0930 by Ashley Miranda RN  Outcome: Progressing  Goal: Maintains/Returns to pre admission  functional level  Description: INTERVENTIONS:  - Perform AM-PAC 6 Click Basic Mobility/ Daily Activity assessment daily.  - Set and communicate daily mobility goal to care team and patient/family/caregiver.   - Collaborate with rehabilitation services on mobility goals if consulted  - Perform Range of Motion  times a day.  - Reposition patient every  hours.  - Dangle patient  times a day  - Stand patient  times a day  - Ambulate patient  times a day  - Out of bed to chair  times a day   - Out of bed for meals times a day  - Out of bed for toileting  - Record patient progress and toleration of activity level   1/20/2024 0930 by Ashley Miranda, RN  Outcome: Progressing  1/20/2024 0930 by Ashley Miranda, RN  Outcome: Progressing     Problem: DISCHARGE PLANNING  Goal: Discharge to home or other facility with appropriate resources  Description: INTERVENTIONS:  - Identify barriers to discharge w/patient and caregiver  - Arrange for needed discharge resources and transportation as appropriate  - Identify discharge learning needs (meds, wound care, etc.)  - Arrange for interpretive services to assist at discharge as needed  - Refer to Case Management Department for coordinating discharge planning if the patient needs post-hospital services based on physician/advanced practitioner order or complex needs related to functional status, cognitive ability, or social support system  Outcome: Progressing     Problem: Knowledge Deficit  Goal: Patient/family/caregiver demonstrates understanding of disease process, treatment plan, medications, and discharge instructions  Description: Complete learning assessment and assess knowledge base.  Interventions:  - Provide teaching at level of understanding  - Provide teaching via preferred learning methods  Outcome: Progressing     Problem: ALTERATION IN THE BREAST  Goal: Optimize infant feeding at the breast  Description: INTERVENTIONS:  - Latch, breast and nipple assessment  - Assess  prior breast feeding history  - Hand expression of breast milk  - For cracked, bleeding and or sore nipples reassess latch, treat damaged nipple  -Educate mother on feeding cues  -Positioning/latch techniques  Outcome: Progressing     Problem: INADEQUATE LATCH, SUCK OR SWALLOW  Goal: Demonstrate ability to latch and sustain latch, audible swallowing and satiety  Description: INTERVENTIONS:  - Assess oral anatomy, notify Physician/AP for abnormal findings  - Establish milk expression  - Maximize feeding opportunity (skin to skin, behavioral state)  - Position/latch techniques  - Discourage use of pacifier-artificial nipple  - Mechanical pumping  - Nipple Shield  - Supplemental formula feeding (Physician/AP order)  - Alternative feeding method  Outcome: Progressing

## 2024-01-21 VITALS
SYSTOLIC BLOOD PRESSURE: 107 MMHG | WEIGHT: 180 LBS | TEMPERATURE: 98.2 F | BODY MASS INDEX: 30.73 KG/M2 | HEART RATE: 61 BPM | HEIGHT: 64 IN | OXYGEN SATURATION: 98 % | RESPIRATION RATE: 18 BRPM | DIASTOLIC BLOOD PRESSURE: 66 MMHG

## 2024-01-21 DIAGNOSIS — Z98.891 S/P PRIMARY LOW TRANSVERSE C-SECTION: ICD-10-CM

## 2024-01-21 PROCEDURE — 99024 POSTOP FOLLOW-UP VISIT: CPT | Performed by: OBSTETRICS & GYNECOLOGY

## 2024-01-21 RX ORDER — DOCUSATE SODIUM 100 MG/1
100 CAPSULE, LIQUID FILLED ORAL 2 TIMES DAILY
Qty: 60 CAPSULE | Refills: 0 | Status: SHIPPED | OUTPATIENT
Start: 2024-01-21

## 2024-01-21 RX ORDER — FUROSEMIDE 20 MG/1
20 TABLET ORAL DAILY
Qty: 5 TABLET | Refills: 0 | Status: SHIPPED | OUTPATIENT
Start: 2024-01-21 | End: 2024-01-25

## 2024-01-21 RX ORDER — LIDOCAINE 50 MG/G
2 PATCH TOPICAL DAILY
Start: 2024-01-21 | End: 2024-01-25 | Stop reason: SDUPTHER

## 2024-01-21 RX ORDER — ACETAMINOPHEN 325 MG/1
650 TABLET ORAL EVERY 6 HOURS SCHEDULED
Start: 2024-01-21 | End: 2024-01-25 | Stop reason: ALTCHOICE

## 2024-01-21 RX ORDER — POLYETHYLENE GLYCOL 3350 17 G/17G
POWDER, FOR SOLUTION ORAL
Qty: 238 G | Refills: 0 | Status: SHIPPED | OUTPATIENT
Start: 2024-01-21 | End: 2024-01-21

## 2024-01-21 RX ORDER — SENNOSIDES 8.6 MG
8.6 TABLET ORAL DAILY
Qty: 7 TABLET | Refills: 0 | Status: CANCELLED | OUTPATIENT
Start: 2024-01-22 | End: 2024-01-29

## 2024-01-21 RX ORDER — POLYETHYLENE GLYCOL 3350 17 G/17G
17 POWDER, FOR SOLUTION ORAL DAILY PRN
Status: DISCONTINUED | OUTPATIENT
Start: 2024-01-21 | End: 2024-01-21 | Stop reason: HOSPADM

## 2024-01-21 RX ORDER — POLYETHYLENE GLYCOL 3350 17 G/17G
17 POWDER, FOR SOLUTION ORAL DAILY PRN
Qty: 170 G | Refills: 0 | Status: SHIPPED | OUTPATIENT
Start: 2024-01-21 | End: 2024-01-21

## 2024-01-21 RX ORDER — SIMETHICONE 80 MG
80 TABLET,CHEWABLE ORAL 4 TIMES DAILY PRN
Qty: 30 TABLET | Refills: 0 | Status: SHIPPED | OUTPATIENT
Start: 2024-01-21 | End: 2024-01-25

## 2024-01-21 RX ORDER — IBUPROFEN 600 MG/1
600 TABLET ORAL EVERY 6 HOURS
Qty: 30 TABLET | Refills: 0 | Status: SHIPPED | OUTPATIENT
Start: 2024-01-21 | End: 2024-01-25 | Stop reason: SDUPTHER

## 2024-01-21 RX ORDER — SENNOSIDES 8.6 MG
8.6 TABLET ORAL DAILY
Start: 2024-01-22 | End: 2024-01-25

## 2024-01-21 RX ORDER — OXYCODONE HYDROCHLORIDE 5 MG/1
5 TABLET ORAL EVERY 6 HOURS PRN
Qty: 18 TABLET | Refills: 0 | Status: SHIPPED | OUTPATIENT
Start: 2024-01-21 | End: 2024-01-31

## 2024-01-21 RX ADMIN — OXYCODONE HYDROCHLORIDE 5 MG: 5 TABLET ORAL at 13:18

## 2024-01-21 RX ADMIN — IBUPROFEN 600 MG: 600 TABLET ORAL at 08:36

## 2024-01-21 RX ADMIN — ACETAMINOPHEN 650 MG: 325 TABLET, FILM COATED ORAL at 03:36

## 2024-01-21 RX ADMIN — OXYCODONE HYDROCHLORIDE 5 MG: 5 TABLET ORAL at 06:20

## 2024-01-21 RX ADMIN — ENOXAPARIN SODIUM 40 MG: 40 INJECTION SUBCUTANEOUS at 08:36

## 2024-01-21 RX ADMIN — SENNOSIDES 8.6 MG: 8.6 TABLET, FILM COATED ORAL at 08:35

## 2024-01-21 RX ADMIN — IBUPROFEN 600 MG: 600 TABLET ORAL at 03:37

## 2024-01-21 RX ADMIN — LEVOTHYROXINE SODIUM 50 MCG: 50 TABLET ORAL at 06:15

## 2024-01-21 RX ADMIN — ACETAMINOPHEN 650 MG: 325 TABLET, FILM COATED ORAL at 08:36

## 2024-01-21 NOTE — PROGRESS NOTES
Progress Note - OB/GYN  Chikis Amador 38 y.o. female MRN: [id number]  Unit/Bed#: -01 Encounter: [id number]    Assessment and Plan     Chikis Amador is a patient of: [facility] . She is PPD# 3 s/p  primary  section, low transverse incision  Recovering well and is stable       Uterine fibroid in pregnancy  Assessment & Plan  1.  myometrium fibroid located in the anterior corpus region, measuring  1.9 x 0.9 x 1.7cm with a volume of 1.5cc. Color doppler flow was not  increased. The appearance was hypoechoic.     2.  myometrium fibroid located in the right posterior corpus region,  measuring 4.6 x 4.0 x 4.9cm with a volume of 47.2cc. Color doppler flow  was not increased. The appearance was heterogeneous.       Hypothyroid in pregnancy, antepartum, third trimester  Assessment & Plan  Levothyroxine 100 mcg in the am    * S/P primary low transverse   Assessment & Plan  Routine postpartum care  Encourage ambulation  Encourage familial bonding  Lactation support as needed  Pain: Motrin/Tylenol around the clock, oxycodone if needed  Postpartum Contraceptive plan: declines  Pre-delivery Hgb 12.3 --> 12.4  Johnson catheter: passed VT  DVT Ppx: ambulation, SCDs, Lovenox 40mg        Disposition    - Anticipate discharge home today      Subjective/Objective     Chief Complaint: Postpartum State     Subjective:    Chikis Amador is PPD#3 s/p  primary  section, low transverse incision. She has no current complaints.  Pain is well controlled.  Patient is currently voiding.  She is ambulating.  Patient is not currently passing flatus and has had no bowel movement. She is tolerating PO, and denies nausea or vomitting. Patient denies fever, chills, chest pain, shortness of breath, or calf tenderness. Lochia is normal. She is  Breastfeeding. She is recovering well and is stable.   Patient expressed she has not passed gas or had a bowel movement and would like medications to help.  Discussed simethicone  "and MiraLAX.  Will send both to pharmacy for home as well.      Vitals:   /58 (BP Location: Right arm)   Pulse 61   Temp 97.9 °F (36.6 °C) (Oral)   Resp 18   Ht 5' 4\" (1.626 m)   Wt 81.6 kg (180 lb)   SpO2 98%   Breastfeeding Yes   BMI 30.90 kg/m²     No intake or output data in the 24 hours ending 01/21/24 0728    Invasive Devices       None                   Physical Exam:   GEN: Chikis Amador appears well, alert and oriented x 3, pleasant and cooperative   CARDIO: RRR, no murmurs or rubs  RESP:  CTAB, no wheezes or rales  ABDOMEN: soft, no tenderness, no distention, fundus @ U-3, Incision C/D/I  EXTREMITIES: non tender, no erythema, b/l Fortino's sign negative      Labs:     Hemoglobin   Date Value Ref Range Status   01/19/2024 11.3 (L) 11.5 - 15.4 g/dL Final   01/19/2024 12.4 11.5 - 15.4 g/dL Final     WBC   Date Value Ref Range Status   01/19/2024 11.78 (H) 4.31 - 10.16 Thousand/uL Final   01/19/2024 17.79 (H) 4.31 - 10.16 Thousand/uL Final     Platelets   Date Value Ref Range Status   01/19/2024 203 149 - 390 Thousands/uL Final   01/19/2024 183 149 - 390 Thousands/uL Final     Creatinine   Date Value Ref Range Status   01/19/2024 0.72 0.60 - 1.30 mg/dL Final     Comment:     Standardized to IDMS reference method     AST   Date Value Ref Range Status   01/19/2024 21 13 - 39 U/L Final     ALT   Date Value Ref Range Status   01/19/2024 8 7 - 52 U/L Final     Comment:     Specimen collection should occur prior to Sulfasalazine administration due to the potential for falsely depressed results.           Leela De Luna MD  1/21/2024  7:28 AM                       "

## 2024-01-21 NOTE — LACTATION NOTE
This note was copied from a baby's chart.  Met with Chikis who is scheduled for discharge to home with her baby boy today.     Chikis states that baby is breastfeeding well, has been doing some cluster feeding. She has given some formula and is pumping when supplementing.     She states that she has been seen by Lactation and Discharge Breastfeeding Instruction were reviewed. She has no additional questions or concerns at this time.    Chikis does have the [facility] Information for Follow Up breastfeeding support as needed.

## 2024-01-21 NOTE — PLAN OF CARE
Problem: POSTPARTUM  Goal: Experiences normal postpartum course  Description: INTERVENTIONS:  - Monitor maternal vital signs  - Assess uterine involution and lochia  2024 1245 by Rosa Maria Ramos RN  Outcome: Completed  2024 1044 by Rosa Maria Ramos RN  Outcome: Progressing  Goal: Appropriate maternal -  bonding  Description: INTERVENTIONS:  - Identify family support  - Assess for appropriate maternal/infant bonding   -Encourage maternal/infant bonding opportunities  - Referral to  or  as needed  2024 1245 by Rosa Maria Ramos RN  Outcome: Completed  2024 1044 by Rosa Maria Ramos RN  Outcome: Progressing  Goal: Establishment of infant feeding pattern  Description: INTERVENTIONS:  - Assess breast/bottle feeding  - Refer to lactation as needed  2024 1245 by Rosa Marai Ramos RN  Outcome: Completed  2024 1044 by Rosa Maria Ramos RN  Outcome: Progressing  Goal: Incision(s), wounds(s) or drain site(s) healing without S/S of infection  Description: INTERVENTIONS  - Assess and document dressing, incision, wound bed, drain sites and surrounding tissue  - Provide patient and family education  2024 1245 by Rosa Maria Ramos RN  Outcome: Completed  2024 104 by Rosa Maria Ramos RN  Outcome: Progressing     Problem: PAIN - ADULT  Goal: Verbalizes/displays adequate comfort level or baseline comfort level  Description: Interventions:  - Encourage patient to monitor pain and request assistance  - Assess pain using appropriate pain scale  - Administer analgesics based on type and severity of pain and evaluate response  - Implement non-pharmacological measures as appropriate and evaluate response  - Consider cultural and social influences on pain and pain management  - Notify physician/advanced practitioner if interventions unsuccessful or patient reports new pain  2024 1245 by Rosa Maria Ramos RN  Outcome: Completed  2024  1044 by Rosa Maria Ramos RN  Outcome: Progressing     Problem: SAFETY ADULT  Goal: Patient will remain free of falls  Description: INTERVENTIONS:  - Educate patient/family on patient safety including physical limitations  - Instruct patient to call for assistance with activity   - Consult OT/PT to assist with strengthening/mobility   - Keep Call bell within reach  - Keep bed low and locked with side rails adjusted as appropriate  - Keep care items and personal belongings within reach  - Initiate and maintain comfort rounds  - Make Fall Risk Sign visible to staff  - Apply yellow socks and bracelet for high fall risk patients  - Consider moving patient to room near nurses station  1/21/2024 1245 by Rosa Maria Ramos RN  Outcome: Completed  1/21/2024 1044 by Rosa Maria Ramos RN  Outcome: Progressing  Goal: Maintain or return to baseline ADL function  Description: INTERVENTIONS:  -  Assess patient's ability to carry out ADLs; assess patient's baseline for ADL function and identify physical deficits which impact ability to perform ADLs (bathing, care of mouth/teeth, toileting, grooming, dressing, etc.)  - Assess/evaluate cause of self-care deficits   - Assess range of motion  - Assess patient's mobility; develop plan if impaired  - Assess patient's need for assistive devices and provide as appropriate  - Encourage maximum independence but intervene and supervise when necessary  - Involve family in performance of ADLs  - Assess for home care needs following discharge   - Consider OT consult to assist with ADL evaluation and planning for discharge  - Provide patient education as appropriate  1/21/2024 1245 by Rosa Maria Ramos RN  Outcome: Completed  1/21/2024 1044 by Rosa Maria Ramos RN  Outcome: Progressing  Goal: Maintains/Returns to pre admission functional level  Description: INTERVENTIONS:  - Perform AM-PAC 6 Click Basic Mobility/ Daily Activity assessment daily.  - Set and communicate daily mobility  goal to care team and patient/family/caregiver.   - Collaborate with rehabilitation services on mobility goals if consulted  - Out of bed for toileting  - Record patient progress and toleration of activity level   1/21/2024 1245 by Rosa Maria Ramos RN  Outcome: Completed  1/21/2024 1044 by Rosa Maria Ramos RN  Outcome: Progressing     Problem: DISCHARGE PLANNING  Goal: Discharge to home or other facility with appropriate resources  Description: INTERVENTIONS:  - Identify barriers to discharge w/patient and caregiver  - Arrange for needed discharge resources and transportation as appropriate  - Identify discharge learning needs (meds, wound care, etc.)  - Arrange for interpretive services to assist at discharge as needed  - Refer to Case Management Department for coordinating discharge planning if the patient needs post-hospital services based on physician/advanced practitioner order or complex needs related to functional status, cognitive ability, or social support system  1/21/2024 1245 by Rosa Maria Ramos RN  Outcome: Completed  1/21/2024 1044 by Rosa Maria Ramos RN  Outcome: Progressing     Problem: Knowledge Deficit  Goal: Patient/family/caregiver demonstrates understanding of disease process, treatment plan, medications, and discharge instructions  Description: Complete learning assessment and assess knowledge base.  Interventions:  - Provide teaching at level of understanding  - Provide teaching via preferred learning methods  1/21/2024 1245 by Rosa Maria Ramos RN  Outcome: Completed  1/21/2024 1044 by Rosa Maria Ramos RN  Outcome: Progressing     Problem: ALTERATION IN THE BREAST  Goal: Optimize infant feeding at the breast  Description: INTERVENTIONS:  - Latch, breast and nipple assessment  - Assess prior breast feeding history  - Hand expression of breast milk  - For cracked, bleeding and or sore nipples reassess latch, treat damaged nipple  -Educate mother on feeding  cues  -Positioning/latch techniques  1/21/2024 1245 by Rosa Maria Ramos, RN  Outcome: Completed  1/21/2024 1044 by Rosa Maria Ramos, RN  Outcome: Progressing

## 2024-01-25 ENCOUNTER — OFFICE VISIT (OUTPATIENT)
Dept: OBGYN CLINIC | Facility: CLINIC | Age: 39
End: 2024-01-25

## 2024-01-25 VITALS
SYSTOLIC BLOOD PRESSURE: 120 MMHG | BODY MASS INDEX: 28.68 KG/M2 | HEIGHT: 64 IN | DIASTOLIC BLOOD PRESSURE: 74 MMHG | WEIGHT: 168 LBS

## 2024-01-25 DIAGNOSIS — Z98.891 S/P CESAREAN SECTION: ICD-10-CM

## 2024-01-25 DIAGNOSIS — Z98.891 S/P PRIMARY LOW TRANSVERSE C-SECTION: ICD-10-CM

## 2024-01-25 LAB — PLACENTA IN STORAGE: NORMAL

## 2024-01-25 PROCEDURE — 99024 POSTOP FOLLOW-UP VISIT: CPT | Performed by: STUDENT IN AN ORGANIZED HEALTH CARE EDUCATION/TRAINING PROGRAM

## 2024-01-25 RX ORDER — LIDOCAINE 50 MG/G
2 PATCH TOPICAL DAILY
Qty: 15 PATCH | Refills: 0 | Status: SHIPPED | OUTPATIENT
Start: 2024-01-25

## 2024-01-25 RX ORDER — ACETAMINOPHEN 500 MG
1000 TABLET ORAL EVERY 6 HOURS PRN
Qty: 45 TABLET | Refills: 0 | Status: SHIPPED | OUTPATIENT
Start: 2024-01-25

## 2024-01-25 RX ORDER — IBUPROFEN 600 MG/1
600 TABLET ORAL EVERY 6 HOURS
Qty: 30 TABLET | Refills: 0 | Status: SHIPPED | OUTPATIENT
Start: 2024-01-25

## 2024-01-25 NOTE — PROGRESS NOTES
"Postpartum Visit  MD Jenelle    24      Subjective     Chikis Amador is a 38 y.o.  female who presents for a postpartum visit. She is s/p CS at 38w1d on 24.    She delivered a male  \"Lennox Xavier Sardine\"  Incision: painful--nervous about taking tylenol, motrin, or oxycodone  Wasn't given lidocaine patches, hasn't used anything    No fevers  Some bleeding  Otherwise doing well, adjusting well at home    The following portions of the patient's history were reviewed and updated as appropriate: allergies, current medications, past medical history, past social history, past surgical history, and problem list.      Current Outpatient Medications:     acetaminophen (TYLENOL) 500 mg tablet, Take 2 tablets (1,000 mg total) by mouth every 6 (six) hours as needed for mild pain or moderate pain, Disp: 45 tablet, Rfl: 0    ibuprofen (MOTRIN) 600 mg tablet, Take 1 tablet (600 mg total) by mouth every 6 (six) hours When getting more comfortable can use as needed., Disp: 30 tablet, Rfl: 0    levothyroxine 100 mcg tablet, , Disp: , Rfl:     lidocaine (Lidoderm) 5 %, Apply 2 patches topically over 12 hours daily Remove & Discard patch within 12 hours or as directed by MD, Disp: 15 patch, Rfl: 0    Prenatal Vit-Fe Fumarate-FA (PRENATAL PO), Take by mouth, Disp: , Rfl:     docusate sodium (COLACE) 100 mg capsule, Take 1 capsule (100 mg total) by mouth 2 (two) times a day (Patient not taking: Reported on 2024), Disp: 60 capsule, Rfl: 0    omeprazole (PriLOSEC) 20 mg delayed release capsule, TAKE 1 CAPSULE BY MOUTH EVERY DAY (Patient not taking: Reported on 2024), Disp: 90 capsule, Rfl: 2    oxyCODONE (ROXICODONE) 5 immediate release tablet, Take 1 tablet (5 mg total) by mouth every 6 (six) hours as needed for severe pain for up to 10 days Max Daily Amount: 20 mg (Patient not taking: Reported on 2024), Disp: 18 tablet, Rfl: 0    Allergies   Allergen Reactions    Pollen Extract Sneezing     " "Seasonal        Review of Systems  Per hPI    Objective    /74 (BP Location: Left arm, Patient Position: Sitting, Cuff Size: Standard)   Ht 5' 4\" (1.626 m)   Wt 76.2 kg (168 lb)   Breastfeeding Yes   BMI 28.84 kg/m²   Physical Exam  Constitutional:       Appearance: Normal appearance.   HENT:      Head: Normocephalic and atraumatic.   Eyes:      Extraocular Movements: Extraocular movements intact.      Conjunctiva/sclera: Conjunctivae normal.   Pulmonary:      Effort: Pulmonary effort is normal.   Abdominal:      General: There is no distension.      Palpations: Abdomen is soft.      Tenderness: There is no abdominal tenderness.      Comments: Incision clean, dry, intact without erythema, induration, bleeding or discharge.     Musculoskeletal:         General: Normal range of motion.      Cervical back: Normal range of motion.   Skin:     General: Skin is warm and dry.   Neurological:      General: No focal deficit present.      Mental Status: She is alert.   Psychiatric:         Mood and Affect: Mood normal.         Behavior: Behavior normal.         Thought Content: Thought content normal.           Assessment/Plan:  Chikis Amador is a 38 y.o. who is postpartum from a CS with a normal incision check.    Reviewed taking tylenol and motrin first, if insufficient can use oxycodone--reviewed ok with breastfeeding, very small amounts in milk, low likelihood of causing drowsiness/resp depression in her baby  Discussed use of abdominal binder  Incision looks good, healing well  Discussed removing glue in a week or so  RTO in 2-3 weeks (scheduled 2/16/23)      "

## 2024-02-16 ENCOUNTER — POSTPARTUM VISIT (OUTPATIENT)
Dept: OBGYN CLINIC | Facility: CLINIC | Age: 39
End: 2024-02-16

## 2024-02-16 VITALS
SYSTOLIC BLOOD PRESSURE: 116 MMHG | HEIGHT: 64 IN | DIASTOLIC BLOOD PRESSURE: 72 MMHG | BODY MASS INDEX: 27.49 KG/M2 | WEIGHT: 161 LBS

## 2024-02-16 DIAGNOSIS — Z98.891 S/P PRIMARY LOW TRANSVERSE C-SECTION: Primary | ICD-10-CM

## 2024-02-16 PROCEDURE — 99024 POSTOP FOLLOW-UP VISIT: CPT | Performed by: PHYSICIAN ASSISTANT

## 2024-02-16 RX ORDER — FUROSEMIDE 20 MG/1
TABLET ORAL
COMMUNITY
Start: 2024-01-25 | End: 2024-02-16

## 2024-02-16 NOTE — PROGRESS NOTES
"OB POSTPARTUM VISIT PROGRESS NOTE  Date of Encounter: 2024    Chiiks Amador    : 1985  (38 y.o.)  MR: [id number]    Subjective   Chikis is in for her postpartum visit. She is now . She delivered by repeat  section. She's generally doing well, denies current pain or bleeding issues, and has no significant depression issues. She is breast feeding with some supplementation. We discussed all appropriate contraceptive options and she chooses None.  She will plan barrier and withdrawal for BC at this point.       Objective   EXAM:  GENERAL: alert, well appearing, and in no distress.  VITALS: Blood pressure 116/72, height 5' 4\" (1.626 m), weight 73 kg (161 lb), currently breastfeeding.  BMI: Body mass index is 27.64 kg/m².  NECK/THYROID: no thyromegaly  HEART: RRR  LUNGS: Clear to auscultation.  BACK: Normal spine, no CVAT.  BREASTS: Deferred  ABDOMEN: Regular Her incision does still have a layer of glue on it. Advised washing area with soap and warm water and trying to gently massage glue off. Can also trial Vaseline to help dislodge glue as well.   EXTREMITIES: All normal.      Assessment/Plan   Diagnoses and all orders for this visit:    S/P primary low transverse     Other orders  -     Discontinue: furosemide (LASIX) 20 mg tablet;  (Patient not taking: Reported on 2024)        Leslie Salazar PA-C    "

## 2024-03-25 ENCOUNTER — PATIENT MESSAGE (OUTPATIENT)
Dept: OBGYN CLINIC | Facility: CLINIC | Age: 39
End: 2024-03-25

## 2024-03-25 ENCOUNTER — OFFICE VISIT (OUTPATIENT)
Dept: INTERNAL MEDICINE CLINIC | Facility: CLINIC | Age: 39
End: 2024-03-25
Payer: COMMERCIAL

## 2024-03-25 VITALS
HEART RATE: 92 BPM | SYSTOLIC BLOOD PRESSURE: 104 MMHG | HEIGHT: 64 IN | OXYGEN SATURATION: 97 % | WEIGHT: 163 LBS | TEMPERATURE: 97.6 F | BODY MASS INDEX: 27.83 KG/M2 | DIASTOLIC BLOOD PRESSURE: 68 MMHG

## 2024-03-25 DIAGNOSIS — F41.9 ANXIETY: ICD-10-CM

## 2024-03-25 DIAGNOSIS — E66.3 OVERWEIGHT (BMI 25.0-29.9): ICD-10-CM

## 2024-03-25 DIAGNOSIS — Z00.00 LABORATORY EXAMINATION ORDERED AS PART OF A ROUTINE GENERAL MEDICAL EXAMINATION: ICD-10-CM

## 2024-03-25 DIAGNOSIS — E03.9 ACQUIRED HYPOTHYROIDISM: ICD-10-CM

## 2024-03-25 DIAGNOSIS — R41.3 MEMORY CHANGES: ICD-10-CM

## 2024-03-25 DIAGNOSIS — M79.641 BILATERAL HAND PAIN: Primary | ICD-10-CM

## 2024-03-25 DIAGNOSIS — M79.642 BILATERAL HAND PAIN: Primary | ICD-10-CM

## 2024-03-25 PROCEDURE — 99204 OFFICE O/P NEW MOD 45 MIN: CPT | Performed by: INTERNAL MEDICINE

## 2024-03-25 NOTE — PROGRESS NOTES
Assessment/Plan:    Acquired hypothyroidism  Adequately replaced.  Sees Endo.    Anxiety  Stable, not on medication.    Overweight (BMI 25.0-29.9)  Start regular exercise.       Diagnoses and all orders for this visit:    Bilateral hand pain  Comments:  Differential Dx: OA, RA, CTS.  Orders:  -     RF Screen w/ Reflex to Titer  -     Comprehensive metabolic panel  -     C-reactive protein  -     Cyclic citrul peptide antibody, IgG  -     MEGA Screen w/ Reflex to Titer/Pattern; Future    Memory changes  Comments:  Recommend to observe for now. Post partum stress, anxiety may be contributing.  Orders:  -     CBC and differential; Future  -     TSH, 3rd generation with Free T4 reflex; Future    Acquired hypothyroidism  -     TSH, 3rd generation with Free T4 reflex; Future  -     Lipid panel; Future    Laboratory examination ordered as part of a routine general medical examination  -     CBC and differential; Future  -     TSH, 3rd generation with Free T4 reflex; Future  -     Lipid panel; Future  -     RF Screen w/ Reflex to Titer  -     Comprehensive metabolic panel  -     C-reactive protein  -     Cyclic citrul peptide antibody, IgG  -     MEGA Screen w/ Reflex to Titer/Pattern; Future    Anxiety    Overweight (BMI 25.0-29.9)      Follow up in 6 months or as needed.      Subjective:      Patient ID: Chikis Amador is a 38 y.o. female here to establish care.    She just had her baby few months ago.  She has an older 12 y/o daughter also.  She has a few concerns.  She complains of bilateral hand pain and weakness.  She feels that she is unable to  things tightly, left hand worse than the right.  She noticed occasional swelling but not constant.  She has no difficulty taking off her rings at the end of the day.  Reports no other joint pains, denies any muscle aches or fatigue.    She is also concerned about her memory. She feels she is always forgetful, but feels worse recently. She did a cognitive test with her  daughter, not officially.  She could not remember the simple things.  It is starting to worry her.  She does admit to having some anxiety but is able to cope most of the time.    She is currently breast-feeding.  She had her period a few days ago.  She is asking if this is normal.  She has not reached out to gynecology.      The following portions of the patient's history were reviewed and updated as appropriate: allergies, current medications, past family history, past medical history, past social history, past surgical history, and problem list.    Past Medical History:   Diagnosis Date   • Disease of thyroid gland     Hyper to now hypo   • Hypothyroidism    • Memory loss     Im not sure if its brain fog or memory loss but i do feel forgetful at times   • Uterine fibroid      Past Surgical History:   Procedure Laterality Date   • BREAST IMPLANT     • BREAST SURGERY      Breast augmentation   •  SECTION  2024   • [place]  DELIVERY ONLY N/A 2024    Procedure:  SECTION ();  Surgeon: Lamar Almanzar MD;  Location: [facility] ;  Service: Obstetrics     Family History   Problem Relation Age of Onset   • COPD Father    • Coronary artery disease Father          78   • Autism Brother    • Down syndrome Brother    • Cancer Paternal Grandfather      Social History     Socioeconomic History   • Marital status: /Civil Union     Spouse name: Not on file   • Number of children: Not on file   • Years of education: Not on file   • Highest education level: Not on file   Occupational History   • Not on file   Tobacco Use   • Smoking status: Never   • Smokeless tobacco: Never   Vaping Use   • Vaping status: Never Used   Substance and Sexual Activity   • Alcohol use: Yes     Alcohol/week: 1.0 standard drink of alcohol     Types: 1 Glasses of wine per week     Comment: not currently due to preg   • Drug use: Never   • Sexual activity: Yes     Partners: Male     Birth  "control/protection: None     Comment: not at the moment, recovering from preg   Other Topics Concern   • Not on file   Social History Narrative         14 y/o daughter, 2 mo son    One dog     Social Determinants of Health     Financial Resource Strain: Not on file   Food Insecurity: Not on file   Transportation Needs: Not on file   Physical Activity: Not on file   Stress: Not on file   Social Connections: Not on file   Intimate Partner Violence: Not on file   Housing Stability: Not on file       Current Outpatient Medications:   •  levothyroxine 100 mcg tablet, Take 50 mcg by mouth daily, Disp: , Rfl:   •  Prenatal Vit-Fe Fumarate-FA (PRENATAL PO), Take by mouth, Disp: , Rfl:   Allergies   Allergen Reactions   • Pollen Extract Sneezing     Seasonal          Review of Systems   Constitutional:  Negative for appetite change and fatigue.   HENT:  Negative for congestion, ear pain and postnasal drip.    Eyes:  Negative for visual disturbance.   Respiratory:  Negative for cough and shortness of breath.    Cardiovascular:  Negative for chest pain and leg swelling.   Gastrointestinal:  Negative for abdominal pain, constipation and diarrhea.   Genitourinary:  Negative for dysuria, frequency and urgency.   Musculoskeletal:  Positive for arthralgias. Negative for myalgias.   Skin:  Negative for rash and wound.   Neurological:  Negative for dizziness, numbness and headaches.   Hematological:  Does not bruise/bleed easily.   Psychiatric/Behavioral:  Negative for confusion. The patient is not nervous/anxious.          Objective:      /68   Pulse 92   Temp 97.6 °F (36.4 °C)   Ht 5' 4\" (1.626 m)   Wt 73.9 kg (163 lb)   SpO2 97%   BMI 27.98 kg/m²          Physical Exam  Vitals and nursing note reviewed.   Constitutional:       General: She is not in acute distress.     Appearance: She is well-developed.   HENT:      Head: Normocephalic and atraumatic.      Right Ear: Tympanic membrane, ear canal and external ear " normal.      Left Ear: Tympanic membrane, ear canal and external ear normal.      Nose: Nose normal.      Mouth/Throat:      Mouth: Mucous membranes are moist.      Pharynx: Uvula midline.   Eyes:      Conjunctiva/sclera: Conjunctivae normal.      Pupils: Pupils are equal, round, and reactive to light.   Neck:      Thyroid: No thyroid mass.   Cardiovascular:      Rate and Rhythm: Normal rate and regular rhythm.      Heart sounds: Normal heart sounds.   Pulmonary:      Effort: Pulmonary effort is normal.      Breath sounds: Normal breath sounds. No wheezing or rhonchi.   Abdominal:      General: Bowel sounds are normal.      Palpations: Abdomen is soft.      Tenderness: There is no abdominal tenderness.      Hernia: No hernia is present.   Musculoskeletal:         General: Normal range of motion.      Right wrist: Normal. No swelling.      Left wrist: Normal. No swelling.      Right hand: Normal. No swelling, tenderness or bony tenderness.      Left hand: Normal. No swelling, deformity, tenderness or bony tenderness.      Cervical back: Neck supple.      Right lower leg: No edema.      Left lower leg: No edema.      Comments: No joint pain or swelling   Lymphadenopathy:      Cervical: No cervical adenopathy.      Upper Body:      Right upper body: No supraclavicular adenopathy.      Left upper body: No supraclavicular adenopathy.   Skin:     General: Skin is warm.      Findings: No rash.   Neurological:      General: No focal deficit present.      Mental Status: She is alert and oriented to person, place, and time.      Cranial Nerves: No cranial nerve deficit.   Psychiatric:         Mood and Affect: Mood normal.         Behavior: Behavior normal.           Reviewed available records.

## 2024-03-27 ENCOUNTER — APPOINTMENT (OUTPATIENT)
Dept: LAB | Facility: CLINIC | Age: 39
End: 2024-03-27
Payer: COMMERCIAL

## 2024-03-27 DIAGNOSIS — E03.9 ACQUIRED HYPOTHYROIDISM: ICD-10-CM

## 2024-03-27 DIAGNOSIS — M79.642 BILATERAL HAND PAIN: ICD-10-CM

## 2024-03-27 DIAGNOSIS — M79.641 BILATERAL HAND PAIN: ICD-10-CM

## 2024-03-27 DIAGNOSIS — R41.3 MEMORY CHANGES: ICD-10-CM

## 2024-03-27 DIAGNOSIS — Z00.00 LABORATORY EXAMINATION ORDERED AS PART OF A ROUTINE GENERAL MEDICAL EXAMINATION: ICD-10-CM

## 2024-03-27 LAB
ALBUMIN SERPL BCP-MCNC: 3.9 G/DL (ref 3.5–5)
ALP SERPL-CCNC: 84 U/L (ref 34–104)
ALT SERPL W P-5'-P-CCNC: 37 U/L (ref 7–52)
ANA SER QL IA: NEGATIVE
ANION GAP SERPL CALCULATED.3IONS-SCNC: 6 MMOL/L (ref 4–13)
AST SERPL W P-5'-P-CCNC: 28 U/L (ref 13–39)
BASOPHILS # BLD AUTO: 0.04 THOUSANDS/ÂΜL (ref 0–0.1)
BASOPHILS NFR BLD AUTO: 1 % (ref 0–1)
BILIRUB SERPL-MCNC: 0.39 MG/DL (ref 0.2–1)
BUN SERPL-MCNC: 12 MG/DL (ref 5–25)
CALCIUM SERPL-MCNC: 9 MG/DL (ref 8.4–10.2)
CHLORIDE SERPL-SCNC: 105 MMOL/L (ref 96–108)
CHOLEST SERPL-MCNC: 198 MG/DL
CO2 SERPL-SCNC: 28 MMOL/L (ref 21–32)
CREAT SERPL-MCNC: 0.7 MG/DL (ref 0.6–1.3)
CRP SERPL QL: 3.1 MG/L
EOSINOPHIL # BLD AUTO: 0.31 THOUSAND/ÂΜL (ref 0–0.61)
EOSINOPHIL NFR BLD AUTO: 5 % (ref 0–6)
ERYTHROCYTE [DISTWIDTH] IN BLOOD BY AUTOMATED COUNT: 12.5 % (ref 11.6–15.1)
GFR SERPL CREATININE-BSD FRML MDRD: 109 ML/MIN/1.73SQ M
GLUCOSE P FAST SERPL-MCNC: 82 MG/DL (ref 65–99)
HCT VFR BLD AUTO: 42.8 % (ref 34.8–46.1)
HDLC SERPL-MCNC: 53 MG/DL
HGB BLD-MCNC: 14 G/DL (ref 11.5–15.4)
IMM GRANULOCYTES # BLD AUTO: 0.01 THOUSAND/UL (ref 0–0.2)
IMM GRANULOCYTES NFR BLD AUTO: 0 % (ref 0–2)
LDLC SERPL CALC-MCNC: 129 MG/DL (ref 0–100)
LYMPHOCYTES # BLD AUTO: 2.19 THOUSANDS/ÂΜL (ref 0.6–4.47)
LYMPHOCYTES NFR BLD AUTO: 37 % (ref 14–44)
MCH RBC QN AUTO: 29.7 PG (ref 26.8–34.3)
MCHC RBC AUTO-ENTMCNC: 32.7 G/DL (ref 31.4–37.4)
MCV RBC AUTO: 91 FL (ref 82–98)
MONOCYTES # BLD AUTO: 0.4 THOUSAND/ÂΜL (ref 0.17–1.22)
MONOCYTES NFR BLD AUTO: 7 % (ref 4–12)
NEUTROPHILS # BLD AUTO: 3.02 THOUSANDS/ÂΜL (ref 1.85–7.62)
NEUTS SEG NFR BLD AUTO: 50 % (ref 43–75)
NONHDLC SERPL-MCNC: 145 MG/DL
NRBC BLD AUTO-RTO: 0 /100 WBCS
PLATELET # BLD AUTO: 272 THOUSANDS/UL (ref 149–390)
PMV BLD AUTO: 10.1 FL (ref 8.9–12.7)
POTASSIUM SERPL-SCNC: 4.1 MMOL/L (ref 3.5–5.3)
PROT SERPL-MCNC: 6.8 G/DL (ref 6.4–8.4)
RBC # BLD AUTO: 4.71 MILLION/UL (ref 3.81–5.12)
SODIUM SERPL-SCNC: 139 MMOL/L (ref 135–147)
TRIGL SERPL-MCNC: 80 MG/DL
TSH SERPL DL<=0.05 MIU/L-ACNC: 1.4 UIU/ML (ref 0.45–4.5)
WBC # BLD AUTO: 5.97 THOUSAND/UL (ref 4.31–10.16)

## 2024-03-27 PROCEDURE — 84443 ASSAY THYROID STIM HORMONE: CPT

## 2024-03-27 PROCEDURE — 86430 RHEUMATOID FACTOR TEST QUAL: CPT | Performed by: INTERNAL MEDICINE

## 2024-03-27 PROCEDURE — 80061 LIPID PANEL: CPT

## 2024-03-27 PROCEDURE — 86038 ANTINUCLEAR ANTIBODIES: CPT

## 2024-03-27 PROCEDURE — 36415 COLL VENOUS BLD VENIPUNCTURE: CPT

## 2024-03-27 PROCEDURE — 86431 RHEUMATOID FACTOR QUANT: CPT | Performed by: INTERNAL MEDICINE

## 2024-03-27 PROCEDURE — 86140 C-REACTIVE PROTEIN: CPT | Performed by: INTERNAL MEDICINE

## 2024-03-27 PROCEDURE — 80053 COMPREHEN METABOLIC PANEL: CPT | Performed by: INTERNAL MEDICINE

## 2024-03-27 PROCEDURE — 85025 COMPLETE CBC W/AUTO DIFF WBC: CPT

## 2024-03-27 PROCEDURE — 86200 CCP ANTIBODY: CPT | Performed by: INTERNAL MEDICINE

## 2024-03-28 LAB
CRYOGLOB RF SER-ACNC: ABNORMAL [IU]/ML
RHEUMATOID FACT SER QL LA: POSITIVE

## 2024-03-29 LAB — CCP AB SER IA-ACNC: 1.2

## 2024-04-01 DIAGNOSIS — R76.8 RHEUMATOID FACTOR POSITIVE: Primary | ICD-10-CM

## 2024-04-01 DIAGNOSIS — M79.642 BILATERAL HAND PAIN: ICD-10-CM

## 2024-04-01 DIAGNOSIS — M79.641 BILATERAL HAND PAIN: ICD-10-CM

## 2024-04-08 ENCOUNTER — TELEPHONE (OUTPATIENT)
Age: 39
End: 2024-04-08

## 2024-04-08 NOTE — TELEPHONE ENCOUNTER
Radha, from [facility], patient's previous PCP, called and will be faxing the patient's records to [phone].  The fax will contain between 20-30 pages.

## 2024-04-24 ENCOUNTER — TELEPHONE (OUTPATIENT)
Age: 39
End: 2024-04-24

## 2024-06-11 ENCOUNTER — ANNUAL EXAM (OUTPATIENT)
Dept: OBGYN CLINIC | Facility: CLINIC | Age: 39
End: 2024-06-11
Payer: COMMERCIAL

## 2024-06-11 VITALS
DIASTOLIC BLOOD PRESSURE: 76 MMHG | HEIGHT: 64 IN | BODY MASS INDEX: 27.83 KG/M2 | SYSTOLIC BLOOD PRESSURE: 112 MMHG | WEIGHT: 163 LBS

## 2024-06-11 DIAGNOSIS — N94.10 DYSPAREUNIA IN FEMALE: ICD-10-CM

## 2024-06-11 DIAGNOSIS — Z01.419 WOMEN'S ANNUAL ROUTINE GYNECOLOGICAL EXAMINATION: Primary | ICD-10-CM

## 2024-06-11 DIAGNOSIS — M62.89 HIGH-TONE PELVIC FLOOR DYSFUNCTION IN FEMALE: ICD-10-CM

## 2024-06-11 DIAGNOSIS — Z12.31 ENCOUNTER FOR SCREENING MAMMOGRAM FOR BREAST CANCER: ICD-10-CM

## 2024-06-11 PROCEDURE — 99395 PREV VISIT EST AGE 18-39: CPT | Performed by: STUDENT IN AN ORGANIZED HEALTH CARE EDUCATION/TRAINING PROGRAM

## 2024-06-11 PROCEDURE — 99396 PREV VISIT EST AGE 40-64: CPT | Performed by: STUDENT IN AN ORGANIZED HEALTH CARE EDUCATION/TRAINING PROGRAM

## 2024-06-11 RX ORDER — PHENTERMINE HYDROCHLORIDE 37.5 MG/1
37.5 TABLET ORAL EVERY MORNING
COMMUNITY
Start: 2024-05-30

## 2024-06-11 NOTE — PROGRESS NOTES
[facility]   Annual Well Woman Exam  Almanzar    ASSESSMENT & PLAN: Chikis Amador is a 38 y.o.  with normal gynecologic exam.    1.  Routine well woman exam done today.  2.  Pap and HPV:Pap with HPV was not done today.  Current ASCCP Guidelines reviewed.   3.  Chikis is low risk and does not need or desire STI testing today  4.  Chikis notes dyspareunia and decreased libido since delivery, high tone PF noted on exam, discussed lubrication and PFPT  5.  The following were reviewed in today's visit: breast self exam, mammography screening ordered, exercise, and healthy diet. Discussed HPV vaccine counseling,   6.  Return to office in 12 months for annual, sooner PRN.     All questions answered to the best of my ability.      Subjective:    CC:  Annual Gynecologic Examination    HPI: Chikis Amador is a 38 y.o.  who presents for annual gynecologic examination.      Barrier and withdrawal  Has 1 embryo, waiting for recommended 18 months, will reach out to LILIANA when Lennox approached 1 year to start planning.    Pap 2023: NILM, HPV neg  Mammogram: Nof  Colonoscopy: NOF    Gardasil: hasnt completed    GYN  No pelvic pain  Stopped breastfeeding, not pumping, about 2-3 weeks  Got period 2 months after delivering    Denies severe anxiety/depression  TSH repeated and wnl  +RA with PCP    Walking 1 hour/day, hasn't done in last two weeks with daughters bday  Well-balanced, healthy diet    OB       G/U  Complaints: denies  Denies hematuria, urinary incontinence, and dysuria    Breast  Complaints: denies  Denies: breast lump, nipple discharge, and skin lesion(s)  Has implants    Family hx: denies fhx of breast, uterine, ovarian, or colon cancers  Patient does not do regular self-exams    Health Maintenance:    She is not exercising regularly, more difficult with Lennox   She wears her seatbelt routinely.      She feels safe at home and denies domestic violence.  Lives with , daughter, and  lennox  She does not use tobacco and minimal alcohol--maybe 1 glass on weekend.    Past Medical History:   Diagnosis Date    Disease of thyroid gland 2010    Hyper to now hypo    Hypothyroidism     Memory loss     Im not sure if its brain fog or memory loss but i do feel forgetful at times    Uterine fibroid        Past Surgical History:   Procedure Laterality Date    BREAST IMPLANT      BREAST SURGERY  2013    Breast augmentation     SECTION  2024    [place]  DELIVERY ONLY N/A 2024    Procedure:  SECTION ();  Surgeon: Lamar Almanzar MD;  Location: [facility] ;  Service: Obstetrics       Past OB/Gyn History:     Patient's last menstrual period was 05/15/2024.    Family History:  Family History   Problem Relation Age of Onset    COPD Father     Coronary artery disease Father          78    Autism Brother     Down syndrome Brother     Cancer Paternal Grandfather        Social History:  Social History     Socioeconomic History    Marital status: /Civil Union     Spouse name: Not on file    Number of children: Not on file    Years of education: Not on file    Highest education level: Not on file   Occupational History    Not on file   Tobacco Use    Smoking status: Never    Smokeless tobacco: Never   Vaping Use    Vaping status: Never Used   Substance and Sexual Activity    Alcohol use: Yes     Alcohol/week: 1.0 standard drink of alcohol     Types: 1 Glasses of wine per week     Comment: rare    Drug use: Never    Sexual activity: Yes     Partners: Male     Birth control/protection: None   Other Topics Concern    Not on file   Social History Narrative         14 y/o daughter, 2 mo son    One dog     Social Determinants of Health     Financial Resource Strain: Not on file   Food Insecurity: Not on file   Transportation Needs: Not on file   Physical Activity: Not on file   Stress: Not on file   Social Connections: Not on file   Intimate Partner Violence: Not on  "file   Housing Stability: Not on file       Allergies   Allergen Reactions    Pollen Extract Sneezing     Seasonal        Current Outpatient Medications:     Acetaminophen (TYLENOL PO), Take by mouth, Disp: , Rfl:     levothyroxine 100 mcg tablet, Take 50 mcg by mouth daily, Disp: , Rfl:     phentermine (ADIPEX-P) 37.5 MG tablet, Take 37.5 mg by mouth every morning, Disp: , Rfl:     Prenatal Vit-Fe Fumarate-FA (PRENATAL PO), Take by mouth (Patient not taking: Reported on 2024), Disp: , Rfl:     Review of Systems:  Denies chest pain, shortness of breath, abdominal pain, nausea, vomiting, urinary incontinence, urinary frequency, vaginal bleeding, vaginal discharge. All other systems negative unless otherwise stated.     Physical Exam:  /76 (BP Location: Left arm, Patient Position: Sitting, Cuff Size: Standard)   Ht 5' 4\" (1.626 m)   Wt 73.9 kg (163 lb)   LMP 05/15/2024   BMI 27.98 kg/m²  Body mass index is 27.98 kg/m².   GEN: The patient was alert and oriented x3, pleasant well-appearing female in no acute distress.   HEENT:  Unremarkable, no anterior or posterior lymphadenopathy, no thyromegaly  CV:  Regular rate and rhythm, normal S1 and S2, no murmurs  RESP:  Clear to auscultation bilaterally, no wheezes, rales or rhonchi  BREAST:  Symmetric breasts with no palpable breast masses or obvious breast lesions. She has no retractions or nipple discharge. She has no axillary abnormalities or palpable masses.   GI:  Soft, nontender, non-distended,  scar, well healed  MSK: bilateral lower extremities are nontender, no edema  : Normal appearing external female genitalia, normal appearing urethral meatus. On sterile speculum exam, normal appearing vaginal epithelium, no vaginal discharge, no bleeding, grossly normal appearing cervix. On bimanual exam, no cervical motion tenderness; uterus is smooth, mobile, non-tender, normal weeks size. No tenderness or fullness in the bilateral adnexa. ++ttp " midline vagina at 6 o'clock internally

## 2024-09-03 ENCOUNTER — HOSPITAL ENCOUNTER (OUTPATIENT)
Dept: MAMMOGRAPHY | Facility: HOSPITAL | Age: 39
Discharge: HOME | End: 2024-09-03
Payer: COMMERCIAL

## 2024-09-03 VITALS — BODY MASS INDEX: 26.63 KG/M2 | WEIGHT: 156 LBS | HEIGHT: 64 IN

## 2024-09-03 DIAGNOSIS — Z12.31 ENCOUNTER FOR SCREENING MAMMOGRAM FOR BREAST CANCER: ICD-10-CM

## 2024-09-03 PROCEDURE — 77063 BREAST TOMOSYNTHESIS BI: CPT

## 2024-09-03 PROCEDURE — 77067 SCR MAMMO BI INCL CAD: CPT

## 2024-09-27 ENCOUNTER — OFFICE VISIT (OUTPATIENT)
Dept: INTERNAL MEDICINE CLINIC | Facility: CLINIC | Age: 39
End: 2024-09-27
Payer: COMMERCIAL

## 2024-09-27 VITALS
WEIGHT: 154 LBS | OXYGEN SATURATION: 99 % | SYSTOLIC BLOOD PRESSURE: 98 MMHG | BODY MASS INDEX: 26.29 KG/M2 | HEIGHT: 64 IN | DIASTOLIC BLOOD PRESSURE: 56 MMHG | HEART RATE: 72 BPM | TEMPERATURE: 96.8 F

## 2024-09-27 DIAGNOSIS — F41.9 ANXIETY: ICD-10-CM

## 2024-09-27 DIAGNOSIS — Z79.899 ENCOUNTER FOR LONG-TERM CURRENT USE OF MEDICATION: ICD-10-CM

## 2024-09-27 DIAGNOSIS — R76.8 ELEVATED RHEUMATOID FACTOR: ICD-10-CM

## 2024-09-27 DIAGNOSIS — E55.9 VITAMIN D DEFICIENCY: ICD-10-CM

## 2024-09-27 DIAGNOSIS — E78.49 OTHER HYPERLIPIDEMIA: ICD-10-CM

## 2024-09-27 DIAGNOSIS — Z00.00 HEALTH MAINTENANCE EXAMINATION: ICD-10-CM

## 2024-09-27 DIAGNOSIS — E66.3 OVERWEIGHT (BMI 25.0-29.9): ICD-10-CM

## 2024-09-27 DIAGNOSIS — E03.9 ACQUIRED HYPOTHYROIDISM: Primary | ICD-10-CM

## 2024-09-27 PROCEDURE — 99396 PREV VISIT EST AGE 40-64: CPT | Performed by: INTERNAL MEDICINE

## 2024-09-27 PROCEDURE — 99214 OFFICE O/P EST MOD 30 MIN: CPT | Performed by: INTERNAL MEDICINE

## 2024-09-27 RX ORDER — CELECOXIB 200 MG/1
CAPSULE ORAL
COMMUNITY
Start: 2024-07-02

## 2024-09-27 RX ORDER — TOPIRAMATE 50 MG/1
50 TABLET, FILM COATED ORAL EVERY 12 HOURS SCHEDULED
Qty: 90 TABLET | Refills: 0 | Status: SHIPPED | OUTPATIENT
Start: 2024-09-27 | End: 2024-09-27 | Stop reason: SDUPTHER

## 2024-09-27 RX ORDER — TOPIRAMATE 50 MG/1
50 TABLET, FILM COATED ORAL DAILY
Qty: 90 TABLET | Refills: 0 | Status: SHIPPED | OUTPATIENT
Start: 2024-09-27

## 2024-09-27 RX ORDER — AMOXICILLIN 875 MG
875 TABLET ORAL 2 TIMES DAILY
COMMUNITY
Start: 2024-08-05

## 2024-09-27 RX ORDER — PHENTERMINE HYDROCHLORIDE 37.5 MG/1
37.5 TABLET ORAL EVERY MORNING
Qty: 30 TABLET | Refills: 0 | Status: SHIPPED | OUTPATIENT
Start: 2024-09-27

## 2024-09-27 NOTE — PROGRESS NOTES
Ambulatory Visit  Name: Chikis Amador      : 1985      MRN: [id number]  Encounter Provider: Eunice Miranda MD  Encounter Date: 2024   Encounter department: [facility] INTERNAL MEDICINE    Assessment & Plan  Acquired hypothyroidism  Adequately replaced.  Sees Endo yearly.         Overweight (BMI 25.0-29.9)  She is frustrated about her weight would like to be her pre pregnancy weight. She had lost 10 lbs since 6 months ago, took phentermine x 2 months.    Restart phentermine, did not have side effects. Plan to take it for 3 months, may take 1/2 or full tablet. Recommend starting topiramate to suppress appetite.    Recommend strengthening, core and toning exercises.      Orders:    phentermine (ADIPEX-P) 37.5 MG tablet; Take 1 tablet (37.5 mg total) by mouth every morning    topiramate (TOPAMAX) 50 MG tablet; Take 1 tablet (50 mg total) by mouth daily    Anxiety  No recent symptoms.  Not on medication.         Other hyperlipidemia  LDL previously elevated. Limit cheese. May continue taking omega-3.  Low risk.    Orders:    Lipid panel; Future    Comprehensive metabolic panel; Future    Elevated rheumatoid factor  The rest of labs were normal. No recent symptoms.  Did not see rheumatology.         Vitamin D deficiency  Taking D3 about once a week.    Orders:    Vitamin D 25 hydroxy; Future    Encounter for long-term current use of medication    Orders:    Vitamin B12; Future    Health maintenance examination  Updated.        Follow up in 6 months or as needed.    History of Present Illness     She remains very worried about her weight.  She would like to lose her pregnancy weight, about 20 pounds.  She just feels heavier, feels she is moving slower.  She feels more tired and sluggish.  She does not like the way she feels overall.  She did take phentermine for about 2 months.  She stopped taking it because of possible side effects which she did not experience.  She was just nervous taking it  "for a long time even though it worked.  It was prescribed to her by her previous primary care.  She works out at her gym at home.  She walks regularly.    She eats red meat at least once a week, eats a lot of cheese especially for snacks.  She complains of increased appetite especially after dinner.  She would eat a lot of snacks before bedtime.    She takes her vitamins some days of the week.  She denies any chest pain, palpitations or shortness of breath.  She denies feeling anxious.  No issues with constipation.        Review of Systems   Constitutional:  Negative for activity change, appetite change and fatigue.   HENT:  Negative for congestion, ear pain and postnasal drip.    Eyes:  Negative for visual disturbance.   Respiratory:  Negative for cough and shortness of breath.    Cardiovascular:  Negative for chest pain and leg swelling.   Gastrointestinal:  Negative for abdominal pain, constipation and diarrhea.   Genitourinary:  Negative for dysuria, frequency and urgency.   Musculoskeletal:  Negative for arthralgias and myalgias.   Skin:  Negative for rash and wound.   Neurological:  Negative for dizziness and headaches.   Psychiatric/Behavioral:  Negative for confusion and dysphoric mood. The patient is not nervous/anxious.            Objective     BP 98/56   Pulse 72   Temp (!) 96.8 °F (36 °C)   Ht 5' 4\" (1.626 m)   Wt 69.9 kg (154 lb)   LMP 08/26/2024 (Exact Date)   SpO2 99%   BMI 26.43 kg/m²     Physical Exam  Vitals and nursing note reviewed.   Constitutional:       General: She is not in acute distress.     Appearance: She is well-developed.   HENT:      Head: Normocephalic and atraumatic.      Right Ear: External ear normal.      Left Ear: External ear normal.   Eyes:      Pupils: Pupils are equal, round, and reactive to light.   Cardiovascular:      Rate and Rhythm: Normal rate and regular rhythm.      Heart sounds: Normal heart sounds.   Pulmonary:      Effort: Pulmonary effort is normal.      " Breath sounds: Normal breath sounds. No wheezing.   Abdominal:      General: Bowel sounds are normal.      Palpations: Abdomen is soft.   Musculoskeletal:         General: No swelling.      Right lower leg: No edema.      Left lower leg: No edema.   Skin:     General: Skin is warm.      Findings: No rash.   Neurological:      General: No focal deficit present.      Mental Status: She is alert and oriented to person, place, and time.   Psychiatric:         Mood and Affect: Mood and affect normal. Mood is not anxious or depressed.         Behavior: Behavior normal.           Lab results reviewed with patient.

## 2024-09-27 NOTE — ASSESSMENT & PLAN NOTE
LDL previously elevated. Limit cheese. May continue taking omega-3.  Low risk.    Orders:    Lipid panel; Future    Comprehensive metabolic panel; Future

## 2024-09-27 NOTE — ASSESSMENT & PLAN NOTE
She is frustrated about her weight would like to be her pre pregnancy weight. She had lost 10 lbs since 6 months ago, took phentermine x 2 months.    Restart phentermine, did not have side effects. Plan to take it for 3 months, may take 1/2 or full tablet. Recommend starting topiramate to suppress appetite.    Recommend strengthening, core and toning exercises.      Orders:    phentermine (ADIPEX-P) 37.5 MG tablet; Take 1 tablet (37.5 mg total) by mouth every morning    topiramate (TOPAMAX) 50 MG tablet; Take 1 tablet (50 mg total) by mouth daily

## 2024-10-14 ENCOUNTER — PATIENT MESSAGE (OUTPATIENT)
Dept: INTERNAL MEDICINE CLINIC | Facility: CLINIC | Age: 39
End: 2024-10-14

## 2024-10-14 DIAGNOSIS — B00.9 HSV (HERPES SIMPLEX VIRUS) INFECTION: Primary | ICD-10-CM

## 2024-10-14 RX ORDER — VALACYCLOVIR HYDROCHLORIDE 1 G/1
2000 TABLET, FILM COATED ORAL 2 TIMES DAILY
Qty: 4 TABLET | Refills: 1 | Status: SHIPPED | OUTPATIENT
Start: 2024-10-14 | End: 2024-10-15

## 2024-10-17 ENCOUNTER — NURSE TRIAGE (OUTPATIENT)
Age: 39
End: 2024-10-17

## 2024-10-17 DIAGNOSIS — N89.8 VAGINAL IRRITATION: Primary | ICD-10-CM

## 2024-10-17 RX ORDER — FLUCONAZOLE 150 MG/1
150 TABLET ORAL ONCE
Qty: 1 TABLET | Refills: 0 | Status: SHIPPED | OUTPATIENT
Start: 2024-10-17 | End: 2024-10-17

## 2024-10-17 NOTE — TELEPHONE ENCOUNTER
"Patient calling in stating that she has symptoms of a yeast infection. [facility] and started medications for a yeast infection. Pt stating she was provided 1 dose of diflucan and vaginal cream, pt stating that she still is feeling the symptoms with in the last 2 days. Pt stating that she's still experiencing white thick discharge, and itching externally.  Pt stating that she has a history of chronic yeast infections in the past. Patient is requesting to be seen. Pt denies fever, vaginal bleeding, pain with urination, injury to genital area.       Spoke to Mariama in the office who then transferred the call to Polina. As per Polina, no appts available and patient will be called back with an appt date and time. Patient was notified and patient verbalized understanding.           Reason for Disposition   Patient wants to be seen    Answer Assessment - Initial Assessment Questions  1. DISCHARGE: \"Describe the discharge.\" (e.g., white, yellow, green, gray, foamy, cottage cheese-like)      White chunky discharge   2. ODOR: \"Is there a bad odor?\"      Pt denies   3. ONSET: \"When did the discharge begin?\"      10/5/24  4. RASH: \"Is there a rash in that area?\" If Yes, ask: \"Describe it.\" (e.g., redness, blisters, sores, bumps)      Pt denies   5. ABDOMINAL PAIN: \"Are you having any abdominal pain?\" If Yes, ask: \"What does it feel like? \" (e.g., crampy, dull, intermittent, constant)       Pt denies   6. ABDOMINAL PAIN SEVERITY: If present, ask: \"How bad is it?\"  (e.g., mild, moderate, severe)   - MILD - doesn't interfere with normal activities    - MODERATE - interferes with normal activities or awakens from sleep    - SEVERE - patient doesn't want to move (R/O peritonitis)       Pt denies   7. CAUSE: \"What do you think is causing the discharge?\" \"Have you had the same problem before? What happened then?\"      Yeast infection   8. OTHER SYMPTOMS: \"Do you have any other symptoms?\" (e.g., fever, " "itching, vaginal bleeding, pain with urination, injury to genital area,)    Pt denies    fever,  vaginal bleeding, pain with urination, injury to genital area  9. PREGNANCY: \"Is there any chance you are pregnant?\" \"When was your last menstrual period?\"      Pt denies pregnancy, LMP 9/24/24    Protocols used: Vaginal Discharge-ADULT-[place]    "

## 2024-11-05 NOTE — PROGRESS NOTES
Ambulatory Visit  Name: Chikis Amador      : 1985      MRN: [id number]  Encounter Provider: Leslie Salazar PA-C  Encounter Date: 2024   Encounter department: [facility]    Assessment & Plan  Acute vaginitis    Orders:    Genital Comprehensive Culture  We reviewed normal exam in office today. Genital cx done per pt request.   We reviewed addition of acidophilus as well as coconut oil w recurrence of sx. Aware OTC 3-7 day monistat should be considered for first line therapy with recurrence as well.     History of Present Illness     Chikis Amador is a 39 y.o. female who presents with recurrent yeast sx. She notes that since she made the apt that ehr sx have resolved but she wanted to make sure no remaining evidence of yeast on exam.     History obtained from : patient  Review of Systems   Constitutional: Negative.    Genitourinary:  Positive for vaginal discharge.   Psychiatric/Behavioral: Negative.       Medical History Reviewed by provider this encounter:       Past Medical History   Past Medical History:   Diagnosis Date    Disease of thyroid gland 2010    Hyper to now hypo    Hypothyroidism     Memory loss     Im not sure if its brain fog or memory loss but i do feel forgetful at times    Uterine fibroid      Past Surgical History:   Procedure Laterality Date    AUGMENTATION MAMMAPLASTY Bilateral 2013    BREAST IMPLANT      BREAST SURGERY  2013    Breast augmentation     SECTION  2024    [place]  DELIVERY ONLY N/A 2024    Procedure:  SECTION ();  Surgeon: Lamar Almanzar MD;  Location: [facility];  Service: Obstetrics     Family History   Problem Relation Age of Onset    No Known Problems Mother     COPD Father     Coronary artery disease Father          78    No Known Problems Daughter     No Known Problems Maternal Grandmother     No Known Problems Maternal Grandfather     No Known Problems Paternal Grandmother     Cancer  Paternal Grandfather     Autism Brother     Down syndrome Brother     No Known Problems Maternal Aunt     No Known Problems Maternal Aunt     No Known Problems Maternal Aunt     No Known Problems Maternal Aunt     No Known Problems Paternal Aunt     No Known Problems Paternal Aunt     No Known Problems Paternal Aunt      Current Outpatient Medications on File Prior to Visit   Medication Sig Dispense Refill    Acetaminophen (TYLENOL PO) Take by mouth      levothyroxine 100 mcg tablet Take 50 mcg by mouth daily      phentermine (ADIPEX-P) 37.5 MG tablet Take 1 tablet (37.5 mg total) by mouth every morning 30 tablet 0    Prenatal Vit-Fe Fumarate-FA (PRENATAL PO) Take by mouth      topiramate (TOPAMAX) 50 MG tablet Take 1 tablet (50 mg total) by mouth daily 90 tablet 0    amoxicillin (AMOXIL) 875 mg tablet Take 875 mg by mouth 2 (two) times a day (Patient not taking: Reported on 11/8/2024)      celecoxib (CeleBREX) 200 mg capsule TAKE ONE TAB DAILY WITH SUPPER. (Patient not taking: Reported on 11/8/2024)      fluconazole (DIFLUCAN) 150 mg tablet Take 150 mg by mouth once (Patient not taking: Reported on 11/8/2024)      methylPREDNISolone 4 MG tablet therapy pack  (Patient not taking: Reported on 11/8/2024)      metroNIDAZOLE (METROGEL) 0.75 % vaginal gel  (Patient not taking: Reported on 11/8/2024)      triamcinolone (KENALOG) 0.1 % cream APPLY TWICE DAILY X 2WEEKS TO THE AFFECTED DRY ITCHY AREAS , TAKE A BREAK FOR A WEEK, THEN AS NEEDED (Patient not taking: Reported on 11/8/2024)      valACYclovir (VALTREX) 1,000 mg tablet Take 2 tablets (2,000 mg total) by mouth 2 (two) times a day for 1 day 4 tablet 1     No current facility-administered medications on file prior to visit.     Allergies   Allergen Reactions    Pollen Extract Sneezing     Seasonal       Current Outpatient Medications on File Prior to Visit   Medication Sig Dispense Refill    Acetaminophen (TYLENOL PO) Take by mouth      levothyroxine 100 mcg tablet  "Take 50 mcg by mouth daily      phentermine (ADIPEX-P) 37.5 MG tablet Take 1 tablet (37.5 mg total) by mouth every morning 30 tablet 0    Prenatal Vit-Fe Fumarate-FA (PRENATAL PO) Take by mouth      topiramate (TOPAMAX) 50 MG tablet Take 1 tablet (50 mg total) by mouth daily 90 tablet 0    amoxicillin (AMOXIL) 875 mg tablet Take 875 mg by mouth 2 (two) times a day (Patient not taking: Reported on 11/8/2024)      celecoxib (CeleBREX) 200 mg capsule TAKE ONE TAB DAILY WITH SUPPER. (Patient not taking: Reported on 11/8/2024)      fluconazole (DIFLUCAN) 150 mg tablet Take 150 mg by mouth once (Patient not taking: Reported on 11/8/2024)      methylPREDNISolone 4 MG tablet therapy pack  (Patient not taking: Reported on 11/8/2024)      metroNIDAZOLE (METROGEL) 0.75 % vaginal gel  (Patient not taking: Reported on 11/8/2024)      triamcinolone (KENALOG) 0.1 % cream APPLY TWICE DAILY X 2WEEKS TO THE AFFECTED DRY ITCHY AREAS , TAKE A BREAK FOR A WEEK, THEN AS NEEDED (Patient not taking: Reported on 11/8/2024)      valACYclovir (VALTREX) 1,000 mg tablet Take 2 tablets (2,000 mg total) by mouth 2 (two) times a day for 1 day 4 tablet 1     No current facility-administered medications on file prior to visit.      Social History     Tobacco Use    Smoking status: Never    Smokeless tobacco: Never   Vaping Use    Vaping status: Never Used   Substance and Sexual Activity    Alcohol use: Yes     Alcohol/week: 1.0 standard drink of alcohol     Types: 1 Glasses of wine per week     Comment: rare    Drug use: Never    Sexual activity: Yes     Partners: Male     Birth control/protection: None         Objective     /70 (BP Location: Left arm, Patient Position: Sitting, Cuff Size: Standard)   Ht 5' 4\" (1.626 m)   Wt 67.6 kg (149 lb)   LMP 10/18/2024 (Approximate)   BMI 25.58 kg/m²     Physical Exam  Constitutional:       Appearance: She is normal weight.   HENT:      Head: Normocephalic and atraumatic.   Cardiovascular:      Rate " and Rhythm: Normal rate.   Pulmonary:      Effort: Pulmonary effort is normal.   Abdominal:      General: Abdomen is flat. Bowel sounds are normal.      Palpations: Abdomen is soft.   Genitourinary:     Labia:         Right: No rash, tenderness, lesion or injury.         Left: No rash, tenderness, lesion or injury.       Urethra: No prolapse, urethral pain, urethral swelling or urethral lesion.   Neurological:      Mental Status: She is alert.   Psychiatric:         Mood and Affect: Mood normal.         Behavior: Behavior normal.         Thought Content: Thought content normal.         Judgment: Judgment normal.

## 2024-11-08 ENCOUNTER — OFFICE VISIT (OUTPATIENT)
Dept: OBGYN CLINIC | Facility: CLINIC | Age: 39
End: 2024-11-08
Payer: COMMERCIAL

## 2024-11-08 VITALS
HEIGHT: 64 IN | BODY MASS INDEX: 25.44 KG/M2 | SYSTOLIC BLOOD PRESSURE: 110 MMHG | WEIGHT: 149 LBS | DIASTOLIC BLOOD PRESSURE: 70 MMHG

## 2024-11-08 DIAGNOSIS — N76.0 ACUTE VAGINITIS: Primary | ICD-10-CM

## 2024-11-08 PROCEDURE — 99213 OFFICE O/P EST LOW 20 MIN: CPT | Performed by: PHYSICIAN ASSISTANT

## 2024-11-08 RX ORDER — FLUCONAZOLE 150 MG/1
150 TABLET ORAL ONCE
COMMUNITY
Start: 2024-10-17

## 2024-11-08 RX ORDER — TRIAMCINOLONE ACETONIDE 1 MG/G
CREAM TOPICAL
COMMUNITY
Start: 2024-10-03

## 2024-11-08 RX ORDER — METRONIDAZOLE 7.5 MG/G
GEL VAGINAL
COMMUNITY
Start: 2024-10-05

## 2024-11-08 RX ORDER — METHYLPREDNISOLONE 4 MG/1
TABLET ORAL
COMMUNITY
Start: 2024-10-06

## 2024-11-27 DIAGNOSIS — E66.3 OVERWEIGHT (BMI 25.0-29.9): ICD-10-CM

## 2024-11-27 NOTE — TELEPHONE ENCOUNTER
Brandon from [facility] called states patient is in their drive thru looking for her refill on Phentermine 37.5 mg. Advised Brandon will send refill request for approval or denial states will inform patient

## 2024-11-29 ENCOUNTER — TELEPHONE (OUTPATIENT)
Age: 39
End: 2024-11-29

## 2024-11-29 RX ORDER — PHENTERMINE HYDROCHLORIDE 37.5 MG/1
37.5 TABLET ORAL EVERY MORNING
Qty: 30 TABLET | Refills: 0 | Status: SHIPPED | OUTPATIENT
Start: 2024-11-29

## 2024-11-29 NOTE — TELEPHONE ENCOUNTER
PA for ADIPEX 37.5MG- DENIED    Reason:(Screenshot if applicable)    Plan exclusion    Message sent to office clinical pool Yes    Denial letter scanned into Media Yes    Appeal started No (Provider will need to decide if appeal is warranted and send clinical documentation to Prior Authorization Team for initiation.)    **Please follow up with your patient regarding denial and next steps**

## 2024-11-29 NOTE — TELEPHONE ENCOUNTER
PA for adipex 37.5mg SUBMITTED to [employer]    via      [x]C[employer]-Case ID #     [x]PA sent as URGENT    All office notes, labs and other pertaining documents and studies sent. Clinical questions answered. Awaiting determination from insurance company.     Turnaround time for your insurance to make a decision on your Prior Authorization can take 7-21 business days.

## 2024-11-30 ENCOUNTER — PATIENT MESSAGE (OUTPATIENT)
Dept: OBGYN CLINIC | Facility: CLINIC | Age: 39
End: 2024-11-30

## 2024-12-02 ENCOUNTER — TELEPHONE (OUTPATIENT)
Age: 39
End: 2024-12-02

## 2024-12-02 NOTE — TELEPHONE ENCOUNTER
Patient called in again regarding possible yeast infection and wanting to know if something could be called in.

## 2024-12-19 DIAGNOSIS — N76.0 VAGINAL INFECTION: Primary | ICD-10-CM

## 2024-12-20 NOTE — TELEPHONE ENCOUNTER
Please call patient.  Ask when vaginal infection started. I can prescribe fluconazole if needed. Can ask gyneoclogy about the vaginal gel.

## 2024-12-23 RX ORDER — METRONIDAZOLE 7.5 MG/G
GEL VAGINAL
Refills: 0 | OUTPATIENT
Start: 2024-12-23

## 2024-12-23 RX ORDER — FLUCONAZOLE 150 MG/1
150 TABLET ORAL ONCE
Qty: 1 TABLET | Refills: 0 | Status: SHIPPED | OUTPATIENT
Start: 2024-12-23 | End: 2024-12-23

## 2024-12-26 DIAGNOSIS — B00.9 HSV (HERPES SIMPLEX VIRUS) INFECTION: ICD-10-CM

## 2024-12-26 DIAGNOSIS — E66.3 OVERWEIGHT (BMI 25.0-29.9): ICD-10-CM

## 2024-12-26 RX ORDER — TOPIRAMATE 50 MG/1
50 TABLET, FILM COATED ORAL DAILY
Qty: 90 TABLET | Refills: 1 | Status: SHIPPED | OUTPATIENT
Start: 2024-12-26

## 2024-12-26 RX ORDER — VALACYCLOVIR HYDROCHLORIDE 1 G/1
2000 TABLET, FILM COATED ORAL 2 TIMES DAILY
Qty: 4 TABLET | Refills: 1 | Status: SHIPPED | OUTPATIENT
Start: 2024-12-26 | End: 2024-12-27

## 2025-01-28 DIAGNOSIS — E66.3 OVERWEIGHT (BMI 25.0-29.9): ICD-10-CM

## 2025-01-28 RX ORDER — PHENTERMINE HYDROCHLORIDE 37.5 MG/1
37.5 TABLET ORAL EVERY MORNING
Qty: 30 TABLET | Refills: 0 | Status: SHIPPED | OUTPATIENT
Start: 2025-01-28

## 2025-04-08 RX ORDER — CLOBETASOL PROPIONATE 0.5 MG/G
CREAM TOPICAL
COMMUNITY
Start: 2025-01-23

## 2025-04-08 RX ORDER — OSELTAMIVIR PHOSPHATE 75 MG/1
CAPSULE ORAL
COMMUNITY
Start: 2024-12-30

## 2025-04-09 ENCOUNTER — APPOINTMENT (OUTPATIENT)
Dept: LAB | Facility: CLINIC | Age: 40
End: 2025-04-09
Payer: COMMERCIAL

## 2025-04-09 ENCOUNTER — OFFICE VISIT (OUTPATIENT)
Dept: INTERNAL MEDICINE CLINIC | Facility: CLINIC | Age: 40
End: 2025-04-09
Payer: COMMERCIAL

## 2025-04-09 VITALS
HEIGHT: 64 IN | HEART RATE: 72 BPM | WEIGHT: 144 LBS | DIASTOLIC BLOOD PRESSURE: 70 MMHG | TEMPERATURE: 96.7 F | SYSTOLIC BLOOD PRESSURE: 122 MMHG | BODY MASS INDEX: 24.59 KG/M2 | OXYGEN SATURATION: 99 %

## 2025-04-09 DIAGNOSIS — E55.9 VITAMIN D DEFICIENCY: ICD-10-CM

## 2025-04-09 DIAGNOSIS — Z79.899 ENCOUNTER FOR LONG-TERM CURRENT USE OF MEDICATION: ICD-10-CM

## 2025-04-09 DIAGNOSIS — E66.3 OVERWEIGHT (BMI 25.0-29.9): ICD-10-CM

## 2025-04-09 DIAGNOSIS — E78.49 OTHER HYPERLIPIDEMIA: ICD-10-CM

## 2025-04-09 DIAGNOSIS — E03.9 ACQUIRED HYPOTHYROIDISM: Primary | ICD-10-CM

## 2025-04-09 DIAGNOSIS — N95.1 PERIMENOPAUSAL SYMPTOMS: ICD-10-CM

## 2025-04-09 DIAGNOSIS — D22.9 NEVUS: ICD-10-CM

## 2025-04-09 DIAGNOSIS — B00.9 HSV (HERPES SIMPLEX VIRUS) INFECTION: ICD-10-CM

## 2025-04-09 DIAGNOSIS — Z12.31 ENCOUNTER FOR SCREENING MAMMOGRAM FOR BREAST CANCER: ICD-10-CM

## 2025-04-09 DIAGNOSIS — Z00.00 ANNUAL PHYSICAL EXAM: ICD-10-CM

## 2025-04-09 LAB
25(OH)D3 SERPL-MCNC: 32.1 NG/ML (ref 30–100)
ALBUMIN SERPL BCG-MCNC: 4.2 G/DL (ref 3.5–5)
ALP SERPL-CCNC: 67 U/L (ref 34–104)
ALT SERPL W P-5'-P-CCNC: 10 U/L (ref 7–52)
ANION GAP SERPL CALCULATED.3IONS-SCNC: 4 MMOL/L (ref 4–13)
AST SERPL W P-5'-P-CCNC: 13 U/L (ref 13–39)
BILIRUB SERPL-MCNC: 0.41 MG/DL (ref 0.2–1)
BUN SERPL-MCNC: 11 MG/DL (ref 5–25)
CALCIUM SERPL-MCNC: 9.6 MG/DL (ref 8.4–10.2)
CHLORIDE SERPL-SCNC: 100 MMOL/L (ref 96–108)
CHOLEST SERPL-MCNC: 195 MG/DL (ref ?–200)
CO2 SERPL-SCNC: 30 MMOL/L (ref 21–32)
CREAT SERPL-MCNC: 0.83 MG/DL (ref 0.6–1.3)
GFR SERPL CREATININE-BSD FRML MDRD: 88 ML/MIN/1.73SQ M
GLUCOSE P FAST SERPL-MCNC: 80 MG/DL (ref 65–99)
HDLC SERPL-MCNC: 55 MG/DL
LDLC SERPL CALC-MCNC: 124 MG/DL (ref 0–100)
NONHDLC SERPL-MCNC: 140 MG/DL
POTASSIUM SERPL-SCNC: 4 MMOL/L (ref 3.5–5.3)
PROT SERPL-MCNC: 7.5 G/DL (ref 6.4–8.4)
SODIUM SERPL-SCNC: 134 MMOL/L (ref 135–147)
TRIGL SERPL-MCNC: 79 MG/DL (ref ?–150)
VIT B12 SERPL-MCNC: 468 PG/ML (ref 180–914)

## 2025-04-09 PROCEDURE — 36415 COLL VENOUS BLD VENIPUNCTURE: CPT

## 2025-04-09 PROCEDURE — 82306 VITAMIN D 25 HYDROXY: CPT

## 2025-04-09 PROCEDURE — 80061 LIPID PANEL: CPT

## 2025-04-09 PROCEDURE — 99396 PREV VISIT EST AGE 40-64: CPT | Performed by: INTERNAL MEDICINE

## 2025-04-09 PROCEDURE — 99214 OFFICE O/P EST MOD 30 MIN: CPT | Performed by: INTERNAL MEDICINE

## 2025-04-09 PROCEDURE — 80053 COMPREHEN METABOLIC PANEL: CPT

## 2025-04-09 PROCEDURE — 82607 VITAMIN B-12: CPT

## 2025-04-09 RX ORDER — VALACYCLOVIR HYDROCHLORIDE 1 G/1
2000 TABLET, FILM COATED ORAL 2 TIMES DAILY
Qty: 4 TABLET | Refills: 1 | Status: SHIPPED | OUTPATIENT
Start: 2025-04-09 | End: 2025-04-10

## 2025-04-09 RX ORDER — PHENTERMINE HYDROCHLORIDE 37.5 MG/1
37.5 TABLET ORAL EVERY MORNING
Qty: 30 TABLET | Refills: 0 | Status: SHIPPED | OUTPATIENT
Start: 2025-04-09

## 2025-04-09 RX ORDER — LEVOTHYROXINE SODIUM 50 UG/1
TABLET ORAL
COMMUNITY
Start: 2025-04-05

## 2025-04-09 NOTE — PROGRESS NOTES
Name: Chikis Amador      : 1985      MRN: [id number]  Encounter Provider: Eunice Miranda MD  Encounter Date: 2025   Encounter department: [facility] INTERNAL MEDICINE  :  Assessment & Plan  Acquired hypothyroidism  Stable. Sees Endo.       Other hyperlipidemia  Repeat lipids, LDL elevated.  Not on medication.       Overweight (BMI 25.0-29.9)  Lost 10 lbs since a year ago. Recommend strengthening, core and toning exercises.  May take phentermine a few days a week, explained not needed for her BMI. May stop topiramate.  Orders:  •  phentermine (ADIPEX-P) 37.5 MG tablet; Take 1 tablet (37.5 mg total) by mouth every morning    Vitamin D deficiency  Takes D3.       HSV (herpes simplex virus) infection  Refilled Valtrex.  Orders:  •  valACYclovir (VALTREX) 1,000 mg tablet; Take 2 tablets (2,000 mg total) by mouth 2 (two) times a day for 1 day    Perimenopausal symptoms  Reassured.  Reviewed importance of regular exercise, adequate sleep, healthy diet etc.       Nevus  Benign lesion recently removed.  Follow up with dermatology as scheduled.       Annual physical exam         Encounter for screening mammogram for breast cancer    Orders:  •  Mammo screening bilateral w 3d and cad; Future    Follow up in 1 year or as needed.         History of Present Illness   She is asking about perimenopause.  She feels overwhelmed and mantilla sometimes, able to catch herself. Denies any sleep issues or hot flashes. She is still getting menstrual periods regularly. She has a 15 month old and feels tired all the time.    She remains concerned about her weight. She wants to lose at least 5 more lbs. She does not take phentermine regularly, takes it when she remembers. She has not been taking Topamax.    She saw dermatology and a precancerous lesion was removed, called nevus spimary. She worried about this.      Review of Systems   Constitutional:  Negative for activity change, appetite change and fatigue.   HENT:   "Negative for congestion.    Eyes:  Negative for visual disturbance.   Respiratory:  Negative for cough and shortness of breath.    Cardiovascular:  Negative for chest pain and leg swelling.   Gastrointestinal:  Negative for abdominal pain, constipation and diarrhea.   Genitourinary:  Negative for dysuria, frequency and urgency.   Musculoskeletal:  Negative for arthralgias and myalgias.   Skin:  Negative for rash and wound.   Neurological:  Negative for dizziness, numbness and headaches.   Psychiatric/Behavioral:  Negative for confusion and sleep disturbance. The patient is nervous/anxious.        Objective   /70   Pulse 72   Temp (!) 96.7 °F (35.9 °C)   Ht 5' 4\" (1.626 m)   Wt 65.3 kg (144 lb)   LMP 04/03/2025   SpO2 99%   BMI 24.72 kg/m²      Physical Exam  Vitals and nursing note reviewed.   Constitutional:       General: She is not in acute distress.     Appearance: She is well-developed.   HENT:      Head: Normocephalic and atraumatic.      Right Ear: Tympanic membrane, ear canal and external ear normal.      Left Ear: Tympanic membrane, ear canal and external ear normal.      Mouth/Throat:      Mouth: Mucous membranes are moist.   Eyes:      Pupils: Pupils are equal, round, and reactive to light.   Cardiovascular:      Rate and Rhythm: Normal rate and regular rhythm.      Heart sounds: Normal heart sounds.   Pulmonary:      Effort: Pulmonary effort is normal.      Breath sounds: Normal breath sounds. No wheezing.   Abdominal:      General: Bowel sounds are normal.      Palpations: Abdomen is soft.   Musculoskeletal:         General: No swelling.      Right lower leg: No edema.      Left lower leg: No edema.   Skin:     General: Skin is warm.      Findings: No rash.   Neurological:      General: No focal deficit present.      Mental Status: She is alert and oriented to person, place, and time.   Psychiatric:         Mood and Affect: Mood and affect normal. Mood is not anxious or depressed.         " Behavior: Behavior normal.         Answers submitted by the patient for this visit:  Annual Physical (Submitted on 4/8/2025)  Diet/Nutrition choices: limited junk food, intermittent fasting, prolonged fasting, consuming 3-5 servings of fruits/vegetables daily, adequate whole grain intake  Exercise choices: walking, moderate cardiovascular exercise  Sleep choices: 4-6 hours of sleep on average  Hearing choices: decreased hearing right ear, decreased hearing left ear  Hearing additional comments: Sometimes i  Vision choices: wears glasses and contacts  Dental choices: regular dental visits, brushes teeth twice daily  Do you currently have an OB/GYN provider that you routinely follow with?: Yes  Last menstrual cycle (if applicable):: 4/3/2025  Any history of sexual transmitted disease/infection?: Yes  Do you have an advance directive/living will?: No  Do you have a durable power of  (POA)?: No    Lab results reviewed with patient.

## 2025-04-09 NOTE — ASSESSMENT & PLAN NOTE
Lost 10 lbs since a year ago. Recommend strengthening, core and toning exercises.  May take phentermine a few days a week, explained not needed for her BMI. May stop topiramate.  Orders:  •  phentermine (ADIPEX-P) 37.5 MG tablet; Take 1 tablet (37.5 mg total) by mouth every morning

## 2025-04-14 ENCOUNTER — RESULTS FOLLOW-UP (OUTPATIENT)
Dept: INTERNAL MEDICINE CLINIC | Facility: CLINIC | Age: 40
End: 2025-04-14

## 2025-05-07 ENCOUNTER — TELEPHONE (OUTPATIENT)
Age: 40
End: 2025-05-07

## 2025-05-07 DIAGNOSIS — Z32.01 POSITIVE PREGNANCY TEST: Primary | ICD-10-CM

## 2025-05-07 NOTE — TELEPHONE ENCOUNTER
Pt was contacted and notified of the providers recommendations, pt verbalized understanding, no further questions.

## 2025-05-07 NOTE — TELEPHONE ENCOUNTER
Patient calling to report + UPT at home. LMP 3/29. She is scheduled for D&V on 5/28 at 8w4d by LMP. She report last baby was conceived via IVF and she is concerned about this pregnancy. Report she has been taking Phentermine intermittently for weight loss and consuming alcohol. Advised patient stop taking the Phentermine and drinking alcohol immediately and start a prenatal vitamin if she is not already taking one. Patient verbalizes understanding. She is requesting blood HCG testing at this time due to history doesn't trust the home test.    Message to on call Dr. Staton.

## 2025-05-13 ENCOUNTER — APPOINTMENT (OUTPATIENT)
Dept: LAB | Facility: CLINIC | Age: 40
End: 2025-05-13
Payer: COMMERCIAL

## 2025-05-13 ENCOUNTER — TELEPHONE (OUTPATIENT)
Age: 40
End: 2025-05-13

## 2025-05-13 DIAGNOSIS — Z32.01 POSITIVE PREGNANCY TEST: ICD-10-CM

## 2025-05-13 LAB — B-HCG SERPL-ACNC: ABNORMAL MIU/ML (ref 0–5)

## 2025-05-13 PROCEDURE — 36415 COLL VENOUS BLD VENIPUNCTURE: CPT

## 2025-05-13 PROCEDURE — 84702 CHORIONIC GONADOTROPIN TEST: CPT

## 2025-05-13 NOTE — TELEPHONE ENCOUNTER
Spoke with patient who wanted to see if a deep tissue massage was ok.  Advised we only recommend prenatal massage by an experienced therapist. Pt verbalized understanding, no further questions or concerns at this time.

## 2025-05-16 ENCOUNTER — APPOINTMENT (OUTPATIENT)
Dept: LAB | Facility: CLINIC | Age: 40
End: 2025-05-16
Payer: COMMERCIAL

## 2025-05-16 LAB — B-HCG SERPL-ACNC: ABNORMAL MIU/ML (ref 0–5)

## 2025-05-16 PROCEDURE — 36415 COLL VENOUS BLD VENIPUNCTURE: CPT

## 2025-05-16 PROCEDURE — 84702 CHORIONIC GONADOTROPIN TEST: CPT

## 2025-05-17 ENCOUNTER — RESULTS FOLLOW-UP (OUTPATIENT)
Dept: LABOR AND DELIVERY | Facility: HOSPITAL | Age: 40
End: 2025-05-17

## 2025-05-22 NOTE — PROGRESS NOTES
Assessment & Plan  Encounter for pregnancy test, result positive    Orders:    AMB US OB < 14 weeks single or first gestation level 1    9 weeks gestation of pregnancy  Viable IUP at  9w0d - MYRNA established to 25 based on ultrasound today  Referral placed for MFM to schedule early anatomy scan and review options for genetic screening  RTO in 2 weeks for OB INTAKE with OB nurse navigator  RTO in 4 weeks for INITIAL PRENATAL - routine ob check with physical exam or sooner if needed  Orders:    Ambulatory Referral to Maternal Fetal Medicine; Future      Subjective     HPI  39 y.o.  who presents with amenorrhea. LMP is an approximation of 3/29/25. This makes her 8w4d corresponding to an MYRNA of 1/3/26.   This is an unplanned and welcomed pregnancy. (Prior baby IVF); was taking phentermine early stages of preg prior to knowing; Also took valtrex to treat an oral cold sore;  (+) HA - comes and go - feels due to sleep being off; tolerating without med at this time; (+) cramping - menstrual like; Denies n/v/vb/lof/edema/dv/smoking;  PNVs + DHA - tolerating daily; r/meagan 1 mg folic acid and DHA daily    TV us done - single viable IUP measuring 2.38 cm by CRL at 9w0d; (+) cardiac activity of 174 bpm;  Assigning a Final MYRNA  Please choose how you are assigning the MYRNA: The LMP is unknown or uncertain, therefore the final MYRNA will be based on today's ultrasound    Final MYRNA: 25 by ultrasound at this encounter.              Ultrasound Probe Disinfection    A transvaginal ultrasound was performed.   Probe identification: probe serial number [id number]  Disinfection process: Disinfection was performed with High Level Disinfection Process (Trophon)    Melvin Nunez PA-C  25  9:16 AM    OB History    Para Term  AB Living   5 2 2  2 2   SAB IAB Ectopic Multiple Live Births      0 2      # Outcome Date GA Lbr Reggie/2nd Weight Sex Type Anes PTL Lv   5 Current            4 Term 24 38w1d  3629 g (8  "lb) M CS-LTranv EPI  ROMEO      Complications: Failed Induction   3 Term 05/31/11 40w0d   F Vag-Spont   ROMEO   2 AB 2005           1 AB 2003               Past Medical History:   Diagnosis Date    Disease of thyroid gland 2010    Hyper to now hypo    Hypothyroidism     Memory loss     Im not sure if its brain fog or memory loss but i do feel forgetful at times    Uterine fibroid          Current Outpatient Medications:     levothyroxine 75 mcg tablet, Take 1 tablet by mouth in the morning, Disp: , Rfl:     Prenatal Vit-Fe Fumarate-FA (PRENATAL PO), Take by mouth, Disp: , Rfl:     valACYclovir (VALTREX) 1,000 mg tablet, Take 2 tablets (2,000 mg total) by mouth 2 (two) times a day for 1 day, Disp: 4 tablet, Rfl: 1    Allergies   Allergen Reactions    Pollen Extract Sneezing     Seasonal        Objective   Vitals:    05/28/25 0834   BP: 118/72   BP Location: Left arm   Patient Position: Sitting   Cuff Size: Standard   Weight: 68 kg (150 lb)   Height: 5' 4\" (1.626 m)     Physical Exam  Vitals reviewed.   Constitutional:       General: She is not in acute distress.     Appearance: Normal appearance. She is not ill-appearing, toxic-appearing or diaphoretic.   HENT:      Head: Normocephalic and atraumatic.     Eyes:      Conjunctiva/sclera: Conjunctivae normal.       Neurological:      Mental Status: She is alert and oriented to person, place, and time.     Psychiatric:         Mood and Affect: Mood normal.         Behavior: Behavior normal.         Thought Content: Thought content normal.         Judgment: Judgment normal.           "

## 2025-05-28 ENCOUNTER — ULTRASOUND (OUTPATIENT)
Age: 40
End: 2025-05-28

## 2025-05-28 ENCOUNTER — TELEPHONE (OUTPATIENT)
Dept: OBGYN CLINIC | Facility: CLINIC | Age: 40
End: 2025-05-28

## 2025-05-28 VITALS
BODY MASS INDEX: 25.61 KG/M2 | WEIGHT: 150 LBS | SYSTOLIC BLOOD PRESSURE: 118 MMHG | DIASTOLIC BLOOD PRESSURE: 72 MMHG | HEIGHT: 64 IN

## 2025-05-28 DIAGNOSIS — Z32.01 ENCOUNTER FOR PREGNANCY TEST, RESULT POSITIVE: Primary | ICD-10-CM

## 2025-05-28 DIAGNOSIS — Z3A.09 9 WEEKS GESTATION OF PREGNANCY: ICD-10-CM

## 2025-05-28 PROBLEM — E03.9 HYPOTHYROID IN PREGNANCY, ANTEPARTUM, THIRD TRIMESTER: Status: RESOLVED | Noted: 2023-09-14 | Resolved: 2025-05-28

## 2025-05-28 PROBLEM — O09.523 MULTIGRAVIDA OF ADVANCED MATERNAL AGE IN THIRD TRIMESTER: Status: RESOLVED | Noted: 2023-09-14 | Resolved: 2025-05-28

## 2025-05-28 PROBLEM — Z78.9 CONCEIVED BY IN VITRO FERTILIZATION: Status: RESOLVED | Noted: 2023-09-15 | Resolved: 2025-05-28

## 2025-05-28 PROBLEM — Z98.891 S/P PRIMARY LOW TRANSVERSE C-SECTION: Status: RESOLVED | Noted: 2023-09-14 | Resolved: 2025-05-28

## 2025-05-28 PROBLEM — O99.283 HYPOTHYROID IN PREGNANCY, ANTEPARTUM, THIRD TRIMESTER: Status: RESOLVED | Noted: 2023-09-14 | Resolved: 2025-05-28

## 2025-05-28 RX ORDER — LEVOTHYROXINE SODIUM 75 UG/1
1 TABLET ORAL DAILY
COMMUNITY
Start: 2025-05-21

## 2025-05-28 NOTE — TELEPHONE ENCOUNTER
----- Message from Mariama MAZARIEGOS sent at 5/28/2025  9:42 AM EDT -----  Lmom for pt to call back to schedule appts  ----- Message -----  From: Melvin Nunez PA-C  Sent: 5/28/2025   9:17 AM EDT  To: [facility]    Dating u/s done at [place] - she needs an ob nurse navigator appt and her NOB part 2 scheduled

## 2025-05-29 ENCOUNTER — TELEPHONE (OUTPATIENT)
Dept: OBGYN CLINIC | Facility: CLINIC | Age: 40
End: 2025-05-29

## 2025-05-29 NOTE — TELEPHONE ENCOUNTER
----- Message from Mariama MAZARIEGOS sent at 5/29/2025  1:09 PM EDT -----  Mycestephaniet msg was sent as well for patient to call our office to schedule these appointments  ----- Message -----  From: Mariama Rendon  Sent: 5/29/2025   1:03 PM EDT  To: Melvin Nunez PA-C; [facility]    Lmom x2 to schedule prenatal appts  ----- Message -----  From: Mariama Rendon  Sent: 5/28/2025   9:42 AM EDT  To: Melvin Nunez PA-C; [facility]    Lmom for pt to call back to schedule appts  ----- Message -----  From: Melvin Nunez PA-C  Sent: 5/28/2025   9:17 AM EDT  To: [facility]    Dating u/s done at [place] - she needs an ob nurse navigator appt and her NOB part 2 scheduled

## 2025-05-30 ENCOUNTER — NURSE TRIAGE (OUTPATIENT)
Age: 40
End: 2025-05-30

## 2025-05-30 NOTE — TELEPHONE ENCOUNTER
"REASON FOR CONVERSATION: Pregnancy Problem    SYMPTOMS: 9 weeks 2 days EDC 12/31/25 noticed dark brownish red streaking only toilet tissue happened x1 last night and today. Has had slight intermittent cramping since + pregnancy confirmation. No active bleeding, not passing tissue, no fever, no vaginal symptoms. Not lightheaded or dizzy. Denies recent intercourse or strenuous activity.      OTHER HEALTH INFORMATION: US 5/28 MARICRUZ Nunez. Next appointment 6/18 NT at [facility] office.     PROTOCOL DISPOSITION: Home Care    CARE ADVICE PROVIDED: advised pelvic rest, increase hydration, place dry panty liner and monitor.  Reviewed s/sx to call back: increased cramping, passing clots or soaking pad or seek UC/ED if concerns over w/e. Patient verbalzied understanding.      PRACTICE FOLLOW-UP: HP sent to provider MARICRUZ Nunez to review.      Reason for Disposition   SPOTTING (single or brief episode)    Answer Assessment - Initial Assessment Questions  1. ONSET: \"When did this bleeding start?\"        Patient notice old dark brownish red streaks on toilet tissue   2. BLEEDING SEVERITY: \"Describe the bleeding that you are having.\" \"How much bleeding is there?\"       Just when she wipes on toilet tissue   3. ABDOMEN PAIN: \"Do you have any pain?\" \"How bad is the pain?\"  (e.g., Scale 0-10; none, mild, moderate, or severe)      Light intermittent cramping   4. PREGNANCY: \"Do you know how many weeks or months pregnant you are?\" \"When was the first day of your last normal menstrual period?\"      9 weeks 2 days   5. ULTRASOUND: \"Have you had an ultrasound during this pregnancy?\"  Note: To confirm intrauterine pregnancy, placenta location.      US 5/28   6. HEMODYNAMIC STATUS: \"Are you weak or feeling lightheaded?\" If Yes, ask: \"Can you stand and walk normally?\"       Denies   7. OTHER SYMPTOMS: \"What other symptoms are you having with the bleeding?\" (e.g., passed tissue, vaginal discharge, fever, menstrual-type cramps)      Denies any other " symptoms    Protocols used: Pregnancy - Vaginal Bleeding Less Than 20 Weeks EGA-Adult-[place]

## 2025-06-02 NOTE — TELEPHONE ENCOUNTER
Called patient to review. Patient reports that she has had no additional concerns of bleeding after that single episode. She has some mild abdominal cramping occasionally, no other concerns. Advised patient to return call if bleeding reoccurs, or present to ED for heavy bleeding or severe abdominal pain. Patient verbalized understanding and is thankful.

## 2025-06-02 NOTE — TELEPHONE ENCOUNTER
If bleeding has resolved - ok to continue watching; if persisting or recurs I would recommend an in office/ultrasound evaluation

## 2025-06-10 ENCOUNTER — PATIENT MESSAGE (OUTPATIENT)
Dept: OBGYN CLINIC | Facility: CLINIC | Age: 40
End: 2025-06-10

## 2025-06-13 ENCOUNTER — NURSE TRIAGE (OUTPATIENT)
Age: 40
End: 2025-06-13

## 2025-06-13 ENCOUNTER — APPOINTMENT (EMERGENCY)
Dept: ULTRASOUND IMAGING | Facility: HOSPITAL | Age: 40
End: 2025-06-13
Payer: COMMERCIAL

## 2025-06-13 ENCOUNTER — HOSPITAL ENCOUNTER (EMERGENCY)
Facility: HOSPITAL | Age: 40
Discharge: HOME | End: 2025-06-13
Attending: OBSTETRICS & GYNECOLOGY
Payer: COMMERCIAL

## 2025-06-13 VITALS
HEART RATE: 68 BPM | DIASTOLIC BLOOD PRESSURE: 53 MMHG | HEIGHT: 64 IN | RESPIRATION RATE: 18 BRPM | BODY MASS INDEX: 25.61 KG/M2 | WEIGHT: 150 LBS | TEMPERATURE: 97.5 F | OXYGEN SATURATION: 100 % | SYSTOLIC BLOOD PRESSURE: 120 MMHG

## 2025-06-13 DIAGNOSIS — O46.90 VAGINAL BLEEDING IN PREGNANCY: Primary | ICD-10-CM

## 2025-06-13 LAB
ERYTHROCYTE [DISTWIDTH] IN BLOOD BY AUTOMATED COUNT: 12.9 % (ref 11.6–15.1)
HCT VFR BLD AUTO: 37.1 % (ref 34.8–46.1)
HGB BLD-MCNC: 12.8 G/DL (ref 11.5–15.4)
MCH RBC QN AUTO: 29.9 PG (ref 26.8–34.3)
MCHC RBC AUTO-ENTMCNC: 34.5 G/DL (ref 31.4–37.4)
MCV RBC AUTO: 87 FL (ref 82–98)
PLATELET # BLD AUTO: 226 THOUSANDS/UL (ref 149–390)
PMV BLD AUTO: 9.2 FL (ref 8.9–12.7)
RBC # BLD AUTO: 4.28 MILLION/UL (ref 3.81–5.12)
WBC # BLD AUTO: 7.93 THOUSAND/UL (ref 4.31–10.16)

## 2025-06-13 PROCEDURE — 85027 COMPLETE CBC AUTOMATED: CPT | Performed by: OBSTETRICS & GYNECOLOGY

## 2025-06-13 PROCEDURE — 36415 COLL VENOUS BLD VENIPUNCTURE: CPT | Performed by: OBSTETRICS & GYNECOLOGY

## 2025-06-13 PROCEDURE — 81514 NFCT DS BV&VAGINITIS DNA ALG: CPT | Performed by: OBSTETRICS & GYNECOLOGY

## 2025-06-13 PROCEDURE — 99284 EMERGENCY DEPT VISIT MOD MDM: CPT

## 2025-06-13 PROCEDURE — 76817 TRANSVAGINAL US OBSTETRIC: CPT

## 2025-06-13 PROCEDURE — 76816 OB US FOLLOW-UP PER FETUS: CPT

## 2025-06-13 PROCEDURE — 99284 EMERGENCY DEPT VISIT MOD MDM: CPT | Performed by: EMERGENCY MEDICINE

## 2025-06-13 NOTE — PATIENT COMMUNICATION
Received ESC reply from Dr Gibson she would be happy to see Chikis in the ED.    Contacted Chikis who is currently on the way to ED. ETA 10 min.  Provided apology for her frustration.  Advised DR. Gibson will see her in the ED. Recommended call back upon discharge to schedule follow up if indicated.     Chikis appreciative of Dr. Gibson's response.    AN ED Charge added to ESC chat.

## 2025-06-13 NOTE — TELEPHONE ENCOUNTER
Call was placed to office from , asking if we had anything available for patient to come in today for a office visit, informed nothing available today as office is booked for the rest of the day. Informed them of this. Upon chart review, patient is at ED currently

## 2025-06-13 NOTE — TELEPHONE ENCOUNTER
"REASON FOR CONVERSATION: Vaginal Bleeding - Pregnant    SYMPTOMS: vaginal bleeding, dizziness     OTHER HEALTH INFORMATION: Pt calling in saying that she's 11w2d , pt saying that she is having bright red bleeding that started this morning. Pts bleeding is when wiping \"a few drops\". Pt saying that she had cramping yesterday, pt denies cramping now. Pt reporting having sexual intercourse 3-4 days ago. Pt reporting feeling lightheaded.     Pt is advised to go to the emergency department for further evaluation due to dizziness and bleeding, pt requesting to be seen in the office as she refuses to go to ED now. pt is requesting to be seen in the ED, pt saying her dizziness is contributed to \"seeing blood and not eating since yesterday\".     [employer] Chat sent to Dr Gibson   [employer] Chat received: recommend ED as well, if pt declining, can monitor at home, increase fluids and if having pain or increased bleeding to go to ED.     Pt is contacted and notified of the providers recommendations, pt is expressing frustration, and pt is still requesting an office visit today. Pt stated \"I'm going to be switching doctors because this is insane\". Pt verbalized that she isnt feel dizzy anymore as she ate and drank fluids, pt is still requesting to be seen in the office.    [employer] Chat received From Dr Gibson: can offer visit    Placed call to patient, no answer, left a non detailed voice message to call back with phone number provided [phone]      PROTOCOL DISPOSITION: go to ED now     CARE ADVICE PROVIDED: Pt is provided care advice, and is notified when to call back, pt verbalized understanding.      PRACTICE FOLLOW-UP: n/a         Answer Assessment - Initial Assessment Questions  1. ONSET: \"When did this bleeding start?\"        This morning   2. BLEEDING SEVERITY: \"Describe the bleeding that you are having.\" \"How much bleeding is there?\"       Bright red bleeding \"drops\" when wiping.   3. ABDOMEN PAIN: " "\"Do you have any pain?\" \"How bad is the pain?\"  (e.g., Scale 0-10; none, mild, moderate, or severe)      Pt denies cramping   4. PREGNANCY: \"Do you know how many weeks or months pregnant you are?\" \"When was the first day of your last normal menstrual period?\"      11w2d  5. ULTRASOUND: \"Have you had an ultrasound during this pregnancy?\"  Note: To confirm intrauterine pregnancy, placenta location.      Pt had US   6. HEMODYNAMIC STATUS: \"Are you weak or feeling lightheaded?\" If Yes, ask: \"Can you stand and walk normally?\"       Pt reporting feeling lightheaded. Pt saying that when standing she feels lightheaded.   7. OTHER SYMPTOMS: \"What other symptoms are you having with the bleeding?\" (e.g., passed tissue, vaginal discharge, fever, menstrual-type cramps)      Pt denies passed tissue, vaginal discharge, fever, menstrual-type cramps    Protocols used: Pregnancy - Vaginal Bleeding Less Than 20 Weeks EGA-Adult-[place]    "

## 2025-06-13 NOTE — DISCHARGE INSTRUCTIONS
You were seen in the Emergency Department for: Vaginal bleeding    Your workup today showed: Reassuring ultrasound, normal lab work    Your next steps should include: Continue to follow-up with your OB/GYN as scheduled    Reasons to RETURN IMMEDIATELY to the Emergency Department: You develop severe abdominal pain, nausea, vomiting, worsening bleeding or any new or worsening symptoms that concern you

## 2025-06-13 NOTE — ED PROVIDER NOTES
Time reflects when diagnosis was documented in both MDM as applicable and the Disposition within this note       Time User Action Codes Description Comment    2025 12:28 PM Geoff Jonas Add [O46.90] Vaginal bleeding in pregnancy           ED Disposition       ED Disposition   Discharge    Condition   Stable    Date/Time     1:22 PM    Comment   Chikis Amador discharge to home/self care.                   Assessment & Plan       Medical Decision Making    Chikis Amador is a 39-year-old female  who is currently 11 weeks pregnant presenting to the ED for vaginal bleeding.   Patient without any abdominal pain that be concerning for UTI, appendicitis, cholecystitis.  Patient CBC unremarkable.  Transvaginal ultrasound showing good fetal heart tones and single intrauterine pregnancy.  OB/GYN consulted.  OB/GYN also discussed results with patient and gave reassurance.    Dispo: discharge to home  Prescriptions: None  Other: Follow-up with OB/GYN    Discussed results and plan with patient. Patient was agreeable and expressed understanding. Discussed return precautions.         Medications - No data to display    ED Risk Strat Scores            No data recorded        SBIRT 20yo+      Flowsheet Row Most Recent Value   Initial Alcohol Screen: US AUDIT-C     1. How often do you have a drink containing alcohol? 0 Filed at: 2025 1123   2. How many drinks containing alcohol do you have on a typical day you are drinking?  0 Filed at: 2025 1123   3a. Male UNDER 65: How often do you have five or more drinks on one occasion? 0 Filed at: 2025 1123   3b. FEMALE Any Age, or MALE 65+: How often do you have 4 or more drinks on one occassion? 0 Filed at: 2025 1123   Audit-C Score 0 Filed at: 2025 1123   NASEEM: How many times in the past year have you...    Used an illegal drug or used a prescription medication for non-medical reasons? Never Filed at: 2025 1123                             History of Present Illness       Chief Complaint   Patient presents with    Pregnancy Problem     Patient sent to ED for OB evaluation for bleeding that started this morning, cramping last night, mild cramping today, 11 weeks pregnant.       Past Medical History[1]   Past Surgical History[2]   Family History[3]   Social History[4]   E-Cigarette/Vaping    E-Cigarette Use Never User       E-Cigarette/Vaping Substances      I have reviewed and agree with the history as documented.       Pregnancy Problem  Associated symptoms: no abdominal pain, no back pain, no dizziness, no dysuria, no fever, no nausea and no vaginal discharge      Chikis Amador is a 39-year-old female  who is currently 11 weeks pregnant presenting to the ED for vaginal bleeding.  Patient has not had any complications during this pregnancy.  Last pregnancy with IVF, but this pregnancy was unplanned, but welcomed.  This morning, patient noticed some vaginal bleeding with wiping.  She denies any nausea, vomiting, abdominal pain, chest pain, shortness of breath, lightheadedness or dizziness.  No normal rest of fluids.     Review of Systems   Constitutional:  Negative for chills and fever.   HENT:  Negative for congestion and rhinorrhea.    Eyes:  Negative for visual disturbance.   Respiratory:  Negative for cough and shortness of breath.    Cardiovascular:  Negative for chest pain.   Gastrointestinal:  Negative for abdominal pain, nausea and vomiting.   Genitourinary:  Positive for vaginal bleeding. Negative for dysuria, flank pain, vaginal discharge and vaginal pain.   Musculoskeletal:  Negative for back pain and neck pain.   Skin:  Negative for wound.   Neurological:  Negative for dizziness, light-headedness and headaches.           Objective       ED Triage Vitals [25 1122]   Temperature Pulse Blood Pressure Respirations SpO2 Patient Position - Orthostatic VS   97.5 °F (36.4 °C) 68 120/53 18 100 % Sitting      Temp  Source Heart Rate Source BP Location FiO2 (%) Pain Score    Oral Monitor Right arm -- --      Vitals      Date and Time Temp Pulse SpO2 Resp BP Pain Score FACES Pain Rating User   06/13/25 1122 97.5 °F (36.4 °C) 68 100 % 18 120/53 -- -- LO            Physical Exam  Constitutional:       General: She is not in acute distress.  HENT:      Head: Normocephalic and atraumatic.      Mouth/Throat:      Mouth: Mucous membranes are moist.      Pharynx: Oropharynx is clear.     Eyes:      Extraocular Movements: Extraocular movements intact.      Conjunctiva/sclera: Conjunctivae normal.       Cardiovascular:      Rate and Rhythm: Normal rate and regular rhythm.      Pulses: Normal pulses.      Heart sounds: Normal heart sounds.   Pulmonary:      Effort: Pulmonary effort is normal. No respiratory distress.   Abdominal:      Palpations: Abdomen is soft.      Tenderness: There is no abdominal tenderness.     Musculoskeletal:         General: No tenderness or deformity. Normal range of motion.      Cervical back: Normal range of motion. No rigidity.     Skin:     General: Skin is warm and dry.      Capillary Refill: Capillary refill takes less than 2 seconds.     Neurological:      General: No focal deficit present.      Mental Status: She is alert and oriented to person, place, and time. Mental status is at baseline.         Results Reviewed       Procedure Component Value Units Date/Time    Molecular Vaginal Panel [[id number]] Collected: 06/13/25 1310    Lab Status: In process Specimen: Genital from Vaginal Updated: 06/13/25 1318    CBC [[id number]]  (Normal) Collected: 06/13/25 1202    Lab Status: Final result Specimen: Blood from Arm, Right Updated: 06/13/25 1211     WBC 7.93 Thousand/uL      RBC 4.28 Million/uL      Hemoglobin 12.8 g/dL      Hematocrit 37.1 %      MCV 87 fL      MCH 29.9 pg      MCHC 34.5 g/dL      RDW 12.9 %      Platelets 226 Thousands/uL      MPV 9.2 fL             US OB Follow up with Transvaginal   Final  Interpretation by Pradeep Salgado MD ( [id number])      Single live intrauterine gestation at 10 weeks 4 days (range +/- 1 week).      MYRNA of 2025.         Workstation performed: [id number]             Procedures    ED Medication and Procedure Management   Prior to Admission Medications   Prescriptions Last Dose Informant Patient Reported? Taking?   Prenatal Vit-Fe Fumarate-FA (PRENATAL PO)   Yes No   Sig: Take by mouth   levothyroxine 75 mcg tablet   Yes No   Sig: Take 1 tablet by mouth in the morning   valACYclovir (VALTREX) 1,000 mg tablet   No No   Sig: Take 2 tablets (2,000 mg total) by mouth 2 (two) times a day for 1 day      Facility-Administered Medications: None     Discharge Medication List as of 2025  1:24 PM        CONTINUE these medications which have NOT CHANGED    Details   levothyroxine 75 mcg tablet Take 1 tablet by mouth in the morning, Starting Wed 2025, Historical Med      Prenatal Vit-Fe Fumarate-FA (PRENATAL PO) Take by mouth, Historical Med      valACYclovir (VALTREX) 1,000 mg tablet Take 2 tablets (2,000 mg total) by mouth 2 (two) times a day for 1 day, Starting 2025, Until Thu 4/10/2025, Normal           No discharge procedures on file.  ED SEPSIS DOCUMENTATION   Time reflects when diagnosis was documented in both MDM as applicable and the Disposition within this note       Time User Action Codes Description Comment    2025 12:28 PM Geoff Jonas Add [O46.90] Vaginal bleeding in pregnancy                    [1]   Past Medical History:  Diagnosis Date    Disease of thyroid gland 2010    Hyper to now hypo    Hypothyroidism     Memory loss     Im not sure if its brain fog or memory loss but i do feel forgetful at times    Uterine fibroid    [2]   Past Surgical History:  Procedure Laterality Date    AUGMENTATION MAMMAPLASTY Bilateral 2013    BREAST IMPLANT      BREAST SURGERY  2013    Breast augmentation     SECTION  2024    [place]  DELIVERY ONLY N/A  2024    Procedure:  SECTION ();  Surgeon: Lamar Almanzar MD;  Location: [facility] ;  Service: Obstetrics   [3]   Family History  Problem Relation Name Age of Onset    No Known Problems Mother      COPD Father Savage hernández     Coronary artery disease Father Savage hernández          78    No Known Problems Daughter      No Known Problems Maternal Grandmother      No Known Problems Maternal Grandfather      No Known Problems Paternal Grandmother      Cancer Paternal Grandfather      Autism Brother      Down syndrome Brother      No Known Problems Maternal Aunt      No Known Problems Maternal Aunt      No Known Problems Maternal Aunt      No Known Problems Maternal Aunt      No Known Problems Paternal Aunt      No Known Problems Paternal Aunt      No Known Problems Paternal Aunt     [4]   Social History  Tobacco Use    Smoking status: Never    Smokeless tobacco: Never   Vaping Use    Vaping status: Never Used   Substance Use Topics    Alcohol use: Not Currently     Alcohol/week: 1.0 standard drink of alcohol     Types: 1 Glasses of wine per week     Comment: rare    Drug use: Never        Sue Jonas MD  25

## 2025-06-13 NOTE — ED ATTENDING ATTESTATION
2025  I, Geoff Jonas MD, saw and evaluated the patient. I have discussed the patient with the resident/non-physician practitioner and agree with the resident's/non-physician practitioner's findings, Plan of Care, and MDM as documented in the resident's/non-physician practitioner's note, except where noted. All available labs and Radiology studies were reviewed.  I was present for key portions of any procedure(s) performed by the resident/non-physician practitioner and I was immediately available to provide assistance.       At this point I agree with the current assessment done in the Emergency Department.  I have conducted an independent evaluation of this patient a history and physical is as follows:    S:  Chief Complaint   Patient presents with    Pregnancy Problem     Patient sent to ED for OB evaluation for bleeding that started this morning, cramping last night, mild cramping today, 11 weeks pregnant.     Chikis is a 39 y.o. female who presents with the chief complaint of vaginal bleeding (spotting, few drops when wiping).  She has had some mild pelvic cramping as well.  She also reports some very mild lightheadedness.  She reached out to ob office and was referred to the ED for evaluation.  She has an ultrasound documented in the system which indicates a live IUP but I am unable to review thos images from late May.  She is approximately 11 weeks pregnant.  She is .  She has a history of IVF in the past but not with this pregnancy.    O:  ED Triage Vitals [25 1122]   Temperature Pulse Respirations Blood Pressure SpO2   97.5 °F (36.4 °C) 68 18 120/53 100 %      Temp Source Heart Rate Source Patient Position - Orthostatic VS BP Location FiO2 (%)   Oral Monitor Sitting Right arm --      Pain Score       --         Physical Exam  Vitals and nursing note reviewed.   Constitutional:       General: She is in acute distress.      Appearance: She is well-developed.   HENT:      Head:  Normocephalic and atraumatic.     Eyes:      Pupils: Pupils are equal, round, and reactive to light.     Neck:      Vascular: No JVD.     Cardiovascular:      Rate and Rhythm: Normal rate and regular rhythm.      Heart sounds: Normal heart sounds. No murmur heard.     No friction rub. No gallop.   Pulmonary:      Effort: Pulmonary effort is normal. No respiratory distress.      Breath sounds: Normal breath sounds. No wheezing or rales.   Chest:      Chest wall: No tenderness.   Abdominal:      Tenderness: There is abdominal tenderness (very mild) in the right lower quadrant, suprapubic area and left lower quadrant.     Musculoskeletal:         General: No tenderness. Normal range of motion.      Cervical back: Normal range of motion.     Skin:     General: Skin is warm and dry.     Neurological:      General: No focal deficit present.      Mental Status: She is alert and oriented to person, place, and time.     Psychiatric:         Behavior: Behavior normal.         Thought Content: Thought content normal.         Judgment: Judgment normal.       A/P:  Background: 39 y.o. female presents with vaginal bleeding in 1st trimester (previous ultrasound showed IUP)    Differential DX includes but is not limited to: placental pathology, less likely heterotopic pregnancies, non-specific bleeding in 1st trimester, threatened miscarriage, miscarriage    Plan: cbc, pelvic ultrasound, obgyn consultation (was contacted prior to arrival - already put orders in on this patient).      ED Course     Labs Reviewed   CBC AND PLATELET - Normal       Result Value Ref Range Status    WBC 7.93  4.31 - 10.16 Thousand/uL Final    RBC 4.28  3.81 - 5.12 Million/uL Final    Hemoglobin 12.8  11.5 - 15.4 g/dL Final    Hematocrit 37.1  34.8 - 46.1 % Final    MCV 87  82 - 98 fL Final    MCH 29.9  26.8 - 34.3 pg Final    MCHC 34.5  31.4 - 37.4 g/dL Final    RDW 12.9  11.6 - 15.1 % Final    Platelets 226  149 - 390 Thousands/uL Final    MPV 9.2  8.9  - 12.7 fL Final   MOLECULAR VAGINAL PANEL   UA W REFLEX TO MICROSCOPIC WITH REFLEX TO CULTURE     US OB Follow up with Transvaginal   Final Result      Single live intrauterine gestation at 10 weeks 4 days (range +/- 1 week).      MYRNA of 12/29/2025.         Workstation performed: [id number]           Medications - No data to display      Critical Care Time  Procedures    Time reflects when diagnosis was documented in both MDM as applicable and the Disposition within this note       Time User Action Codes Description Comment    6/13/2025 12:28 PM Geoff Jonas Add [O46.90] Vaginal bleeding in pregnancy           ED Disposition       ED Disposition   Discharge    Condition   Stable    Date/Time   Fri Jun 13, 2025  1:22 PM    Comment   Chikis Amador discharge to home/self care.                   Follow-up Information       Follow up With Specialties Details Why Contact Info Additional Information     [facility] Emergency Medicine Go to  As needed [address] [facility] [id number] [phone] [facility], [address] [place], [place], [address]    Mariama Gibson MD Obstetrics and Gynecology Schedule an appointment as soon as possible for a visit in 1 week  [facility] [facility] [address] [phone]

## 2025-06-13 NOTE — CONSULTS
"Consult - OB/GYN   Chikis Amador 39 y.o. female MRN: [id number]  Unit/Bed#: ED-20 Encounter: [id number]    39 y.o. G***P*** ***sexually active ***reproductive aged female     She is a patient of  ***    Chief complaint:  ***    HPI:  ***    Active Problems:  Problem List[1]  ***    PMH:  Past Medical History[2]    PSH:  Past Surgical History[3]    Social Hx:  ***    Meds:  Medications Ordered Prior to Encounter[4]    Allergies:  Allergies[5]    Physical Exam:  /53 (BP Location: Right arm)   Pulse 68   Temp 97.5 °F (36.4 °C) (Oral)   Resp 18   Ht 5' 4\" (1.626 m)   Wt 68 kg (150 lb)   LMP 2025 (Approximate)   SpO2 100%   BMI 25.75 kg/m²     Physical Exam        Assessment:   39 y.o. G***P***  ***sexually active ***reproductive aged female with ***.    Plan:   1. ***    No new Assessment & Plan notes have been filed under this hospital service since the last note was generated.  Service: OB/GYN      Discussed with  ***.           [1]   Patient Active Problem List  Diagnosis    Uterine fibroid in pregnancy    Anxiety    Acquired hypothyroidism    Overweight (BMI 25.0-29.9)    Other hyperlipidemia    Elevated rheumatoid factor    Vitamin D deficiency    Perimenopausal symptoms    Nevus   [2]   Past Medical History:  Diagnosis Date    Disease of thyroid gland     Hyper to now hypo    Hypothyroidism     Memory loss     Im not sure if its brain fog or memory loss but i do feel forgetful at times    Uterine fibroid    [3]   Past Surgical History:  Procedure Laterality Date    AUGMENTATION MAMMAPLASTY Bilateral 2013    BREAST IMPLANT      BREAST SURGERY  2013    Breast augmentation     SECTION  2024    [place] N/A 2024    Procedure:  SECTION ();  Surgeon: Lamar Almanzar MD;  Location: [facility] ;  Service: Obstetrics   [4]   No current facility-administered medications on file prior to encounter.     Current Outpatient Medications on File " Prior to Encounter   Medication Sig Dispense Refill    levothyroxine 75 mcg tablet Take 1 tablet by mouth in the morning      Prenatal Vit-Fe Fumarate-FA (PRENATAL PO) Take by mouth      valACYclovir (VALTREX) 1,000 mg tablet Take 2 tablets (2,000 mg total) by mouth 2 (two) times a day for 1 day 4 tablet 1   [5]   Allergies  Allergen Reactions    Pollen Extract Sneezing     Seasonal       free fluid present.     IMPRESSION:     Single live intrauterine gestation at 10 weeks 4 days (range +/- 1 week).     MYRNA of 12/29/2025.  Assessment & Plan  Bleeding in early pregnancy  We reviewed the following possible etiologies for bleeding in early pregnancy:   - Urethral/ bladder: Exam confirmed vaginal etiology. No UTI symptoms.  - Bowel: Patient did not have a bowel movement this morning and exam confirmed vaginal etiology  - Vagina: Exam with only old brown blood. No active bleeding on exam. No evidence of vaginal lacerations or excoriations.   - Cervix: Cervix appeared closed. No evidence of contact bleeding. Molecular vaginal panel collected.  - Placenta/ Uterus: No evidence of subchorionic hematoma. Live IUP on ultrasound consistent with LMP.   - Unexplained: At this time, her bleeding remains unexplained but we were able to rule out the causes listed above. She does not currently seem to be having a miscarriage but she was encouraged to continue to monitor for any recurrence of bleeding and keep her MFM appointment on Wednesday.   Ultrasound reviewed in detail and patient able to view images. CBC WNL - no evidence of anemia. She was reassured and comfortable with discharge home at this time. Plan of care discussed with ED docs and patient was discharged.         Mariama Santiago MD  OB/GYN  She/her  6/13/25           [1]   Patient Active Problem List  Diagnosis    Uterine fibroid in pregnancy    Anxiety    Acquired hypothyroidism    Overweight (BMI 25.0-29.9)    Other hyperlipidemia    Elevated rheumatoid factor    Vitamin D deficiency    Perimenopausal symptoms    Nevus   [2]   Past Medical History:  Diagnosis Date    Disease of thyroid gland 2010    Hyper to now hypo    Hypothyroidism     Memory loss     Im not sure if its brain fog or memory loss but i do feel forgetful at times    Uterine fibroid    [3]   Past Surgical History:  Procedure Laterality Date    AUGMENTATION MAMMAPLASTY  Bilateral 2013    BREAST IMPLANT      BREAST SURGERY  2013    Breast augmentation     SECTION  2024    [place]  DELIV[place] N/A 2024    Procedure:  SECTION ();  Surgeon: Lamar Almanzar MD;  Location: [facility] ;  Service: Obstetrics   [4]   No current facility-administered medications on file prior to encounter.     Current Outpatient Medications on File Prior to Encounter   Medication Sig Dispense Refill    levothyroxine 75 mcg tablet Take 1 tablet by mouth in the morning      Prenatal Vit-Fe Fumarate-FA (PRENATAL PO) Take by mouth      valACYclovir (VALTREX) 1,000 mg tablet Take 2 tablets (2,000 mg total) by mouth 2 (two) times a day for 1 day 4 tablet 1   [5]   Allergies  Allergen Reactions    Pollen Extract Sneezing     Seasonal

## 2025-06-14 LAB
C GLABRATA DNA VAG QL NAA+PROBE: NEGATIVE
C KRUSEI DNA VAG QL NAA+PROBE: NEGATIVE
CANDIDA SP 6 PNL VAG NAA+PROBE: NEGATIVE
T VAGINALIS DNA VAG QL NAA+PROBE: NEGATIVE
VAGINOSIS/ITIS DNA PNL VAG PROBE+SIG AMP: NEGATIVE

## 2025-06-17 NOTE — PATIENT INSTRUCTIONS
You elected to have non-invasive prenatal screening (NIPS). This involves a blood test to check for the four most common genetic syndromes (Trisomy 21, Trisomy 13, Trisomy 18, and sex chromosome abnormalities). It also MAY report the biologic sex of the fetus if you opted to learn this information. You can call our office to verbally review results to avoid inadvertently learning this information via [facility], if desired. Results will be visible in your [employer] portal 5-7 business days from when the test is drawn. Please follow all instructions regarding insurance cost/coverage provided to you today. Please contact our office with any concerns or questions. You will need spina bifida screening (called MSAFP) for the baby beginning at 15 weeks gestation, which will be ordered by your obstetrician's office. This test allows for earlier detection of spina bifida than is possible by ultrasound, and is advised in all pregnancies.          Thank you for choosing us for your  care today.  If you have any questions about your ultrasound or care, please do not hesitate to contact us or your primary obstetrician.        Some general instructions for your pregnancy are:    Exercise: Aim for 150 minutes per week of regular exercise.  Walking is great!  Nutrition: Choose healthy sources of calcium, iron, and protein.  Avoid ultraprocessed foods and added sugar.  Learn about Preeclampsia: preeclampsia is a common, potentially serious high blood pressure complication in pregnancy.  A blood pressure of 140mmHg (systolic or top number) or 90mmHg (diastolic or bottom number) should be evaluated by your doctor.  Aspirin is sometimes prescribed in early pregnancy to prevent preeclampsia in women with risk factors - ask your obstetrician if you should be on this medication.  For more resources, visit:  https://[email or web address].[email or web address]  If you smoke, please try to quit completely but also try to reduce your  smoking by as much as possible (as soon as possible).  Do not vape.  Please also avoid cannabis products.  Other warning signs to watch out for in pregnancy or postpartum: chest pain, obstructed breathing or shortness of breath, seizures, thoughts of hurting yourself or your baby, bleeding, a painful or swollen leg, fever, or headache (see [facility] POST-BIRTH Warning Signs campaign).  If these happen call 911.  Itching is also not normal in pregnancy and if you experience this, especially over your hands and feet, potentially worse at night, notify your doctors.

## 2025-06-17 NOTE — PROGRESS NOTES
OFFICE CONSULT  Referring physician:   Melvin Nunez Pa-c  [address]  [place] [id number]      Dear Melvin Nunez      Thank you for requesting a  consultation on your patient Ms. Chikis Amador for the following indications:  Genetic screening.  Prior to the knowledge of pregnancy she reports she took a few doses of phentermine for help with weight loss and used alcohol when she attended a wedding.     History  Medications: Prenatal vitamins, levothyroxine 75 mcg daily ( normally on 50 mcg daily outside of pregnancy) and Valtrex 2000 mg by mouth 2 times a day as needed  Allergies to medications: None  Past medical history: Fibroids, advanced maternal age and will deliver at the age of 40, elevated rheumatoid factor, acquired hypothyroidism with tsh of 2.58 on 25 and who is planning on following with Dr Vilchis from endocrinology for her thyroid levels and titration of her meds.  Past surgical history: Breast augmentation,  section  Past obstetrical history:  5 para 2-0-2-2  SAB in  in 2011 at 40 weeks and 0 days she had a female delivered vaginally  2024 at 38 weeks and 1 day she had an 8 pound baby boy delivered by low-transverse  for failed induction. She would like to .  Social history: Reports no substance use  First generation family history: 1/2 Brother with autism and a 1/2 brother with Down syndrome who is     Ultrasound findings: The ultrasound shows a fetus concordant with dates. The nasal bone and nuchal translucency appears normal. No malformations are seen on today's early ultrasound.     The patient was informed of the findings and counseled about the limitations of the exam in detecting all forms of fetal congenital abnormalities.    She does not report any vaginal bleeding or uterine cramping or contractions.      Specific counseling was provided on the following problems:  We discussed the options for genetic screening which  include invasive testing on the fetal placenta or on fetal skin cells within the amniotic fluid and compared this to noninvasive testing which includes cell free DNA screening.  We reviewed the risks, the benefits and the limitations of each.  In the end patient chose to complete the cell free DNA screen.     Advanced Maternal Age (AMA) is defined as maternal age 35 or greater at the best estimation of the due date. Advanced maternal age is associated with an increased risk of several pregnancy outcomes, including aneuploidy/genetic syndromes, growth abnormalities,  delivery, stillbirth, maternal hypertensive disorders, and gestational diabetes. Despite these increased risks, many women of advanced maternal age have normal, healthy pregnancy outcomes, particularly if they have no co-existing medical conditions. Risk of adverse outcomes is proportional to patient age. These risks can be mitigated but not eliminated by optimizing maternal medical health preconception, aspirin prophylaxis when indicated, and optimizing weight gain.  We generally advise a third trimester growth assessment for the indication of advanced maternal age.  Recommend weekly nst/rowdy starting at 36 weeks in patients who are age 40 or greater.     In the first trimester, the normal range for TSH level is 0.1 to 2.5 mIU/L; this level increases to 0.2 to 3.0 mIU/L in the second trimester and 0.3 to 3.0 mIU/L in the third trimester.  Her TSH levels should be rechecked with each trimester and then her meds titrated to keep her TSH in the normal range for pregnancy. Concurrent use of iron or calcium and her thyroid meds may result in decreased effectiveness of her meds.     Phenteramine risks was reviewed through [employer] and a copy of the fact sheet was given to Chikis.  This medication is not recommended to be used during pregnancy.  So far studies have not shown an increased risk for malformation in patients who are on the medication in the  first trimester.      Alcohol use in pregnancy is not recommended secondary to development of possible alcohol syndrome in children.  We discussed that most cases of alcohol syndrome occur in children whose mothers were drinking throughout pregnancy.  The risk is less if it was just an isolated incident in the first trimester.     Future tests recommended:  The results of her NIPT will return in 7-10 days.  Screening for spina bifida with an MSAFP screen is a future test that can be prescribed through her OB office.  This blood work should be drawn preferably at 16 to 18 weeks so that the results return prior to her next scan.  The test though can be run until 21 weeks and 6 days if needed.    Future ultrasounds ordered today:   Fetal Level II ultrasound imaging is recommended at 19-20 weeks' gestation.    Pre visit time reviewing her records   10 minutes  Face to face time 20 minutes  Post visit time on documentation of note, updating her problem list, adding orders and prescriptions 15 minutes.  Procedures that were completed today were charged separately.   The level of decision making was moderate complexity.    Melody Pedersen MD

## 2025-06-18 ENCOUNTER — ANCILLARY PROCEDURE (OUTPATIENT)
Dept: PERINATAL CARE | Facility: CLINIC | Age: 40
End: 2025-06-18
Attending: PHYSICIAN ASSISTANT
Payer: COMMERCIAL

## 2025-06-18 VITALS
SYSTOLIC BLOOD PRESSURE: 100 MMHG | WEIGHT: 154 LBS | BODY MASS INDEX: 26.29 KG/M2 | OXYGEN SATURATION: 98 % | HEART RATE: 74 BPM | DIASTOLIC BLOOD PRESSURE: 60 MMHG | HEIGHT: 64 IN

## 2025-06-18 DIAGNOSIS — Z3A.12 12 WEEKS GESTATION OF PREGNANCY: ICD-10-CM

## 2025-06-18 DIAGNOSIS — E07.9 THYROID DISEASE DURING PREGNANCY IN FIRST TRIMESTER: ICD-10-CM

## 2025-06-18 DIAGNOSIS — O09.521 SUPERVISION OF ELDERLY MULTIGRAVIDA IN FIRST TRIMESTER: Primary | ICD-10-CM

## 2025-06-18 DIAGNOSIS — O34.211 MATERNAL CARE DUE TO LOW TRANSVERSE UTERINE SCAR FROM PREVIOUS CESAREAN DELIVERY: ICD-10-CM

## 2025-06-18 DIAGNOSIS — O99.281 THYROID DISEASE DURING PREGNANCY IN FIRST TRIMESTER: ICD-10-CM

## 2025-06-18 DIAGNOSIS — O35.5XX0 SUSPECTED DAMAGE TO FETUS FROM DRUGS, AFFECTING MANAGEMENT OF MOTHER, ANTEPARTUM, SINGLE OR UNSPECIFIED FETUS: ICD-10-CM

## 2025-06-18 DIAGNOSIS — O09.521 MULTIGRAVIDA OF ADVANCED MATERNAL AGE IN FIRST TRIMESTER: ICD-10-CM

## 2025-06-18 PROCEDURE — NC001 PR NO CHARGE: Performed by: OBSTETRICS & GYNECOLOGY

## 2025-06-18 PROCEDURE — 99204 OFFICE O/P NEW MOD 45 MIN: CPT | Performed by: OBSTETRICS & GYNECOLOGY

## 2025-06-18 PROCEDURE — 76813 OB US NUCHAL MEAS 1 GEST: CPT | Performed by: OBSTETRICS & GYNECOLOGY

## 2025-06-18 PROCEDURE — 76801 OB US < 14 WKS SINGLE FETUS: CPT | Performed by: OBSTETRICS & GYNECOLOGY

## 2025-06-18 PROCEDURE — 36415 COLL VENOUS BLD VENIPUNCTURE: CPT | Performed by: OBSTETRICS & GYNECOLOGY

## 2025-06-18 NOTE — LETTER
2025     Melvin Nunez PA-C  [address]  [place] [address]    Patient: Chikis Amador   YOB: 1985   Date of Visit: 2025       Dear Dr. Melvin Nunez PA-C:    Thank you for referring Chikis Amador to me for evaluation. Below are my notes for this consultation.    If you have questions, please do not hesitate to call me. I look forward to following your patient along with you.         Sincerely,        Melody Pedersen MD        CC: No Recipients    Melody Pedersen MD  2025 12:39 PM  Sign when Signing Visit  OFFICE CONSULT  Referring physician:   Melvin Nunez Pa-c  [address]  [place]      Dear Melvin Nunez      Thank you for requesting a  consultation on your patient Ms. Chikis Amador for the following indications:  Genetic screening.  Prior to the knowledge of pregnancy she reports she took a few doses of phentermine for help with weight loss and used alcohol when she attended a wedding.     History  Medications: Prenatal vitamins, levothyroxine 75 mcg daily ( normally on 50 mcg daily outside of pregnancy) and Valtrex 2000 mg by mouth 2 times a day as needed  Allergies to medications: None  Past medical history: Fibroids, advanced maternal age and will deliver at the age of 40, elevated rheumatoid factor, acquired hypothyroidism with tsh of 2.58 on 25 and who is planning on following with Dr Vilchis from endocrinology for her thyroid levels and titration of her meds.  Past surgical history: Breast augmentation,  section  Past obstetrical history:  5 para 2-0-2-2  SAB in  in 2011 at 40 weeks and 0 days she had a female delivered vaginally  2024 at 38 weeks and 1 day she had an 8 pound baby boy delivered by low-transverse  for failed induction. She would like to .  Social history: Reports no substance use  First generation family history: 1/2 Brother with autism and a 1/2 brother with Down  syndrome who is     Ultrasound findings: The ultrasound shows a fetus concordant with dates. The nasal bone and nuchal translucency appears normal. No malformations are seen on today's early ultrasound.     The patient was informed of the findings and counseled about the limitations of the exam in detecting all forms of fetal congenital abnormalities.    She does not report any vaginal bleeding or uterine cramping or contractions.      Specific counseling was provided on the following problems:  We discussed the options for genetic screening which include invasive testing on the fetal placenta or on fetal skin cells within the amniotic fluid and compared this to noninvasive testing which includes cell free DNA screening.  We reviewed the risks, the benefits and the limitations of each.  In the end patient chose to complete the cell free DNA screen.   Advanced Maternal Age (AMA) is defined as maternal age 35 or greater at the best estimation of the due date. Advanced maternal age is associated with an increased risk of several pregnancy outcomes, including aneuploidy/genetic syndromes, growth abnormalities,  delivery, stillbirth, maternal hypertensive disorders, and gestational diabetes. Despite these increased risks, many women of advanced maternal age have normal, healthy pregnancy outcomes, particularly if they have no co-existing medical conditions. Risk of adverse outcomes is proportional to patient age. These risks can be mitigated but not eliminated by optimizing maternal medical health preconception, aspirin prophylaxis when indicated, and optimizing weight gain.  We generally advise a third trimester growth assessment for the indication of advanced maternal age.  Recommend weekly nst/rowdy starting at 36 weeks in patients who are age 40 or greater.   In the first trimester, the normal range for TSH level is 0.1 to 2.5 mIU/L; this level increases to 0.2 to 3.0 mIU/L in the second trimester and  0.3 to 3.0 mIU/L in the third trimester.  Her TSH levels should be rechecked with each trimester and then her meds titrated to keep her TSH in the normal range for pregnancy. Concurrent use of iron or calcium and her thyroid meds may result in decreased effectiveness of her meds.   Phentermine was reviewed through [employer] and a copy of the fact sheet was given to Chikis.  This medication in pregnancy.  It is not recommended to be used during pregnancy.  So far studies have not shown an increased risk for malformation in patients who are on the medication in the first trimester.    Alcohol use in pregnancy is not recommended secondary to development of possible alcohol syndrome in children.  We discussed that most cases of alcohol syndrome occur in children whose mothers were drinking throughout pregnancy.  The risk is less if it was just an isolated incident in the first trimester.     Future tests recommended:  The results of her NIPT will return in 7-10 days.  Screening for spina bifida with an MSAFP screen is a future test that can be prescribed through her OB office.  This blood work should be drawn preferably at 16 to 18 weeks so that the results return prior to her next scan.  The test though can be run until 21 weeks and 6 days if needed.    Future ultrasounds ordered today:   Fetal Level II ultrasound imaging is recommended at 19-20 weeks' gestation.    Pre visit time reviewing her records   10 minutes  Face to face time 20 minutes  Post visit time on documentation of note, updating her problem list, adding orders and prescriptions 15 minutes.  Procedures that were completed today were charged separately.   The level of decision making was moderate complexity.    Melody Pedersen MD

## 2025-06-18 NOTE — PROGRESS NOTES
OB/GYN  PN Visit  Chikis Amador  [id number]  2025  12:09 PM  Leslie Salazar PA-C    S: 39 y.o.  12w5d here for PN visit. Pregnancy complicated by AMA, thyroid disease, fibroids, h/o previous , hyperlipidemia.     OB Complaints:  Denies c/o n/v/ha, no edema, no smoking, no DV.   No vb/lof  No cramping/ctxns or signs of PTL.    She reports that she is having some fatigue.  Established care with [facility].     O:    Pre- Vitals    Flowsheet Row Most Recent Value   Prenatal Assessment    Fetal Heart Rate 154   Fundal Height (cm) 14 cm   Movement Absent   Prenatal Vitals    Blood Pressure 102/70   Weight - Scale 68.5 kg (151 lb)   Urine Albumin/Glucose    Dilation/Effacement/Station    Vaginal Drainage    Draining Fluid No   Edema    LLE Edema None   RLE Edema None   Facial Edema None            Gen: no acute distress, nonlabored breathing.  OB exam completed: fundal height, +FHT  Urine: -/-    A/P:  #1. 12w5d GESTATION  Physical exam and cultures done today. Last pap: 23 WNL neg HPV. Pap not done today per ASCCP guidelines.     1. 12 weeks gestation of pregnancy  -     Alpha fetoprotein, maternal; Future; Expected date: 2025 (Use expected date for collection)  2. Prenatal care, subsequent pregnancy in first trimester  Overview:  Overview:     Labs: ordered today   Pap smear:      GC/CT: 25  Prenatal Panel: ordered today   Blood Type: A+ RhoGam not indicated   GBS: plan at 36 weeks    Genetic Testing: NIPS drawn last week at [facility],    Vaccines:  Tdap: will offer at 27 weeks  Flu: offer in season  RSV: Offer in season    Birth Plan: h/o  then 1LTCS due to failed IOL. Desires TOLAC  Yellow packet given and consents signed at 30 weeks.   Feeding Plan: will attempt breast but was unable to w her youngest  Breast pump: TBD  Pediatrician: established   Contraception: TBD  Ultrasounds: 25-normal first trimester US          RTC in 4 weeks

## 2025-06-18 NOTE — LETTER
2025     Melvin Nunez PA-C  [address]  [place] [address]    Patient: Chikis Amador   YOB: 1985   Date of Visit: 2025       Dear Dr. Melvin Nunez PA-C:    Thank you for referring Chikis Amador to me for evaluation. Below are my notes for this consultation.    If you have questions, please do not hesitate to call me. I look forward to following your patient along with you.         Sincerely,        Melody Pedersen MD        CC: No Recipients    Melody Pedersen MD  2025  5:33 PM  Sign when Signing Visit  OFFICE CONSULT  Referring physician:   Melvin Nunez Pa-c  [address]  [place] [address]      Dear Melvin Nunez      Thank you for requesting a  consultation on your patient Ms. Chikis Amador for the following indications:  Genetic screening.  Prior to the knowledge of pregnancy she reports she took a few doses of phentermine for help with weight loss and used alcohol when she attended a wedding.     History  Medications: Prenatal vitamins, levothyroxine 75 mcg daily ( normally on 50 mcg daily outside of pregnancy) and Valtrex 2000 mg by mouth 2 times a day as needed  Allergies to medications: None  Past medical history: Fibroids, advanced maternal age and will deliver at the age of 40, elevated rheumatoid factor, acquired hypothyroidism with tsh of 2.58 on 25 and who is planning on following with Dr Vilchis from endocrinology for her thyroid levels and titration of her meds.  Past surgical history: Breast augmentation,  section  Past obstetrical history:  5 para 2-0-2-2  SAB in  in 2011 at 40 weeks and 0 days she had a female delivered vaginally  2024 at 38 weeks and 1 day she had an 8 pound baby boy delivered by low-transverse  for failed induction. She would like to .  Social history: Reports no substance use  First generation family history: 1/2 Brother with autism and a 1/2 brother with Down  syndrome who is     Ultrasound findings: The ultrasound shows a fetus concordant with dates. The nasal bone and nuchal translucency appears normal. No malformations are seen on today's early ultrasound.     The patient was informed of the findings and counseled about the limitations of the exam in detecting all forms of fetal congenital abnormalities.    She does not report any vaginal bleeding or uterine cramping or contractions.      Specific counseling was provided on the following problems:  We discussed the options for genetic screening which include invasive testing on the fetal placenta or on fetal skin cells within the amniotic fluid and compared this to noninvasive testing which includes cell free DNA screening.  We reviewed the risks, the benefits and the limitations of each.  In the end patient chose to complete the cell free DNA screen.     Advanced Maternal Age (AMA) is defined as maternal age 35 or greater at the best estimation of the due date. Advanced maternal age is associated with an increased risk of several pregnancy outcomes, including aneuploidy/genetic syndromes, growth abnormalities,  delivery, stillbirth, maternal hypertensive disorders, and gestational diabetes. Despite these increased risks, many women of advanced maternal age have normal, healthy pregnancy outcomes, particularly if they have no co-existing medical conditions. Risk of adverse outcomes is proportional to patient age. These risks can be mitigated but not eliminated by optimizing maternal medical health preconception, aspirin prophylaxis when indicated, and optimizing weight gain.  We generally advise a third trimester growth assessment for the indication of advanced maternal age.  Recommend weekly nst/rowdy starting at 36 weeks in patients who are age 40 or greater.     In the first trimester, the normal range for TSH level is 0.1 to 2.5 mIU/L; this level increases to 0.2 to 3.0 mIU/L in the second trimester  and 0.3 to 3.0 mIU/L in the third trimester.  Her TSH levels should be rechecked with each trimester and then her meds titrated to keep her TSH in the normal range for pregnancy. Concurrent use of iron or calcium and her thyroid meds may result in decreased effectiveness of her meds.     Phenteramine risks was reviewed through [employer] and a copy of the fact sheet was given to Chikis.  This medication is not recommended to be used during pregnancy.  So far studies have not shown an increased risk for malformation in patients who are on the medication in the first trimester.      Alcohol use in pregnancy is not recommended secondary to development of possible alcohol syndrome in children.  We discussed that most cases of alcohol syndrome occur in children whose mothers were drinking throughout pregnancy.  The risk is less if it was just an isolated incident in the first trimester.     Future tests recommended:  The results of her NIPT will return in 7-10 days.  Screening for spina bifida with an MSAFP screen is a future test that can be prescribed through her OB office.  This blood work should be drawn preferably at 16 to 18 weeks so that the results return prior to her next scan.  The test though can be run until 21 weeks and 6 days if needed.    Future ultrasounds ordered today:   Fetal Level II ultrasound imaging is recommended at 19-20 weeks' gestation.    Pre visit time reviewing her records   10 minutes  Face to face time 20 minutes  Post visit time on documentation of note, updating her problem list, adding orders and prescriptions 15 minutes.  Procedures that were completed today were charged separately.   The level of decision making was moderate complexity.    Melody Pedersen MD

## 2025-06-18 NOTE — PROGRESS NOTES
Patient chose to have [facility] [id number] Non-Invasive Prenatal Screen [id number] PLUS w/ SCA, WITH fetal sex.  Patient choose to be billed through insurance.     Patient given brochure and is aware [facility] will contact patient's insurance and coordinate coverage.  Provided [facility] contact information. General inquiries [phone], Cost estimates [phone] and [facility] Billing [phone]. Website [email or web address].     Blood collection tubes labeled with patient identifiers (name, medical record number, and date of birth).     Filled out [facility] order form. Patient chose to have blood drawn in our office at time of visit. NIPS was drawn from left arm with a butterfly needle by Marlene ABRAMS .      If patient chose to have blood work drawn at a [facility] lab we requested patient notify MFM (via phone call or [email or web address] message) when blood collected so office can follow up on results.       [facility]aternal [facility]etal [facility] will have results in approximately 5-7 business days and will call patient or notify via [email or web address].  Patient aware viewing lab result online will reveal fetal sex if ordered.    Patient verbalized understanding of all instructions and no questions at this time.

## 2025-06-19 ENCOUNTER — INITIAL PRENATAL (OUTPATIENT)
Dept: OBGYN CLINIC | Facility: CLINIC | Age: 40
End: 2025-06-19

## 2025-06-19 ENCOUNTER — PATIENT MESSAGE (OUTPATIENT)
Dept: OBGYN CLINIC | Facility: CLINIC | Age: 40
End: 2025-06-19

## 2025-06-19 VITALS — DIASTOLIC BLOOD PRESSURE: 58 MMHG | SYSTOLIC BLOOD PRESSURE: 106 MMHG

## 2025-06-19 DIAGNOSIS — Z31.430 ENCOUNTER OF FEMALE FOR TESTING FOR GENETIC DISEASE CARRIER STATUS FOR PROCREATIVE MANAGEMENT: ICD-10-CM

## 2025-06-19 DIAGNOSIS — Z34.91 PRENATAL CARE, FIRST TRIMESTER: Primary | ICD-10-CM

## 2025-06-19 DIAGNOSIS — Z86.39 HISTORY OF HYPOTHYROIDISM: ICD-10-CM

## 2025-06-19 LAB
GLUCOSE UR QL STRIP: NEGATIVE MG/DL
HBV SURFACE AG SERPL QL IA: NONREACTIVE
HCV AB SERPL QL IA: NONREACTIVE
HGB UR QL STRIP: NEGATIVE MG/DL
HIV 1+2 AB+HIV1 P24 AG SERPL QL IA: NONREACTIVE
KETONES UR QL STRIP: NEGATIVE MG/DL
LEGIONELLA SPEC CULT: NORMAL
LEUKOCYTE ESTERASE UR QL STRIP: NEGATIVE /UL
NITRITE UR QL STRIP: NEGATIVE
PH UR: 7 [PH] (ref 4.5–8)
PROT 24H UR-MRATE: NEGATIVE MG/DL
RBC #/AREA URNS HPF: 0 /HPF (ref 0–2)
RUBV IGG SERPL IA-ACNC: 22 INDEX
SL AMB POCT URINE COMMENT: NORMAL
SL AMB TYPE AND SCREEN: NORMAL
SP GR UR: 1.01 (ref 1–1.03)
SQUAMOUS #/AREA URNS HPF: >10 /LPF (ref 0–5)
T PALLIDUM AB SER-ACNC: NONREACTIVE
WBC #/AREA URNS HPF: 0 /HPF (ref 0–5)

## 2025-06-19 PROCEDURE — OBC: Performed by: PHYSICIAN ASSISTANT

## 2025-06-19 NOTE — PROGRESS NOTES
"Details that I feel the provider should be aware of:   - hx sabx2, term  , and term LTCS  IVF pregnancy  - valtrex prn, pt reports hx oral sore outbreaks  - AMA  - hypothryoidism, follows w/ [facility]. 25 tsh 2.58, has f/u in place  - VB in first trimester, eval in ED 25 and denies any further episodes at this time  - Famhx includes half brother w/autism and another half brother w/down syndrome,   - last pap 2023 NILM, neg HPV. Pt reports hx abn pap, +HPV \"years ago\"  - completed NT ultrasound and NIPT w/MFM 25. Anatomy/L2 ultrasound scheduled 25  - medical records request signed at completion of intake to request genetic carrier screening from prior IVF pregnancy, if previously completed    OB INTAKE INTERVIEW  Patient is 39 y.o. who presents for OB intake at 12w1d  She is present alone during this encounter  The father of her baby (Neto) is Chikis's spouse, involved in the pregnancy.      Last Menstrual Period: 3/29/25 approx.  Ultrasound: Measured 9 weeks 0 days on 25  Estimated Date of Delivery: 25  changed by 9 week US    Signs/Symptoms of Pregnancy  Current pregnancy symptoms: fatigue, insomnia. Reviewed OTC recommendations on medication list for sleep aide  Constipation no  Headaches YES - occasional, resolves on own usually, reviewed tylenol, caffeine recommendations in pregnancy  Cramping/spotting YES - pt reports mild abdominal cramping, s/p US yesterday w/MFM aware if persistent or worsening to call office. Pt had VB on 25 and presented to ED for evaluation. Reassurance provided and pt denies any further VB noted.  PICA cravings no    Diabetes-  BMI 26.43  If patient has 1 or more, please order early 1 hour GTT  History of GDM no  BMI >35 no  History of PCOS or current metformin use (should stop for 7 days prior to 1hr GTT unless pre-existing diabetes)  no  History of LGA/macrosomic infant (4000g/9lbs) no    If patient has " 2 or more, please order early 1 hour GTT  BMI>30 no  AMA YES  First degree relative with type 2 diabetes no  History of chronic HTN, hyperlipidemia, elevated A1C no  High risk race (, , ,  or ) no    Hypertension- if you answer yes to any of the following, please order baseline preeclampsia labs (cbc, comprehensive metabolic panel, urine protein creatinine ratio, uric acid)  History of of chronic HTN no  History of gestational HTN no  History of preeclampsia, eclampsia, or HELLP syndrome no  History of diabetes no  History of lupus,sjogrens syndrome, kidney disease no    Thyroid- if yes order TSH with reflex T4  History of thyroid disease YES - follows w/[facility], TSH 2.58 on 25 and f/u in place as recommended/managed by Endocrinologist.    Bleeding Disorder or Hx of DVT-patient or first degree relative with history of. Order the following if not done previously.   (Factor V, antithrombin III, prothrombin gene mutation, protein C and S Ag, lupus anticoagulant, anticardiolipin, beta-2 glycoprotein)   no    OB/GYN-  History of abnormal pap smear YES     yrs ago per pt  Date of last pap smear 2023 NILM, neg HPV  History of HPV YES - yrs ago per pt  History of Herpes/HSV YES - oral outbreaks  History of other STI (gonorrhea, chlamydia, trich) YES - hx chlamydia yrs ago per pt  History of prior  YES  History of prior  YES  History of  delivery prior to 36 weeks 6 days no  History of blood transfusion no  Ok for blood transfusion yes    Substance screening-   History of tobacco use no  Currently using tobacco no  Substance Use Screen Level (N/A, LOW, HIGH) n/a    MRSA Screening-   Does the pt have a hx of MRSA? no    Immunizations:  Influenza vaccine given this season n/a  Discussed Tdap vaccine yes  Discussed COVID Vaccine yes    Genetic/MFM-  Do you or your partner have a history of any of the following in yourselves or first degree  relatives?  Cystic fibrosis no  Spinal muscular atrophy no  Hemoglobinopathy/Sickle Cell/Thalassemia no  Fragile X Intellectual Disability no    Pt's prior pregnancy in  through IVF. Pt believes she completed genetic carrier screening at that time. Unable to confirm after review of records available in chart. Pt signed medical records request to obtain all prior prenatal records including genetic carrier screening if previously completed. Will f/u once received. Reviewed CF/SMA with pt if not previously completed pt desires to complete and will order. [facility] tear off sheet provided and reviewed at this time.      Appointment for Nuchal Translucency Ultrasound at [facility] scheduled for 25 completed. L2/Anatomy scan 25      Interview education  [facility] Pregnancy Essentials Book reviewed, discussed and attached to their AVS yes    Nurse/Family Partnership- patient may qualify no; referral placed no    Prenatal lab work scripts yes - printed as pt plans to complete at [facility]  Extra labs ordered:  N/a    Aspirin/Preeclampsia Screen    Screening completed and pt reports reviewed w/[facility] 25          The patient has a history now or in prior pregnancy notable for:  Hx sabx2, , LTCS, AMA, hx hypothyroidism, VB in first trimester        PN1 visit scheduled. The patient was oriented to our practice, the navigator role, reviewed delivering physicians and [facility] for Delivery. All questions were answered.    Interviewed by: Lovely PETER RN, BSN

## 2025-06-23 ENCOUNTER — INITIAL PRENATAL (OUTPATIENT)
Age: 40
End: 2025-06-23

## 2025-06-23 ENCOUNTER — RESULTS FOLLOW-UP (OUTPATIENT)
Dept: LABOR AND DELIVERY | Facility: HOSPITAL | Age: 40
End: 2025-06-23

## 2025-06-23 VITALS
HEIGHT: 64 IN | SYSTOLIC BLOOD PRESSURE: 102 MMHG | BODY MASS INDEX: 25.78 KG/M2 | DIASTOLIC BLOOD PRESSURE: 70 MMHG | WEIGHT: 151 LBS

## 2025-06-23 DIAGNOSIS — Z34.81 PRENATAL CARE, SUBSEQUENT PREGNANCY IN FIRST TRIMESTER: ICD-10-CM

## 2025-06-23 DIAGNOSIS — Z3A.12 12 WEEKS GESTATION OF PREGNANCY: Primary | ICD-10-CM

## 2025-06-23 LAB
CFDNA.FET/CFDNA.TOTAL SFR FETUS: NORMAL %
CFDNA.FET/CFDNA.TOTAL SFR FETUS: NORMAL %
CITATION REF LAB TEST: NORMAL
FET 13+18+21+X+Y ANEUP PLAS.CFDNA: NEGATIVE
FET CHR 21 TS PLAS.CFDNA QL: NEGATIVE
FET CHR 21 TS PLAS.CFDNA QL: NEGATIVE
FET MS X RISK WBC.DNA+CFDNA QL: NOT DETECTED
FET SEX PLAS.CFDNA DOSAGE CFDNA: NORMAL
FET TS 13 RISK PLAS.CFDNA QL: NEGATIVE
FET X + Y ANEUP RISK PLAS.CFDNA SEQ-IMP: NOT DETECTED
GA EST FROM CONCEPTION DATE: NORMAL D
GESTATIONAL AGE > 9:: YES
LAB DIRECTOR NAME PROVIDER: NORMAL
LABORATORY COMMENT REPORT: NORMAL
LIMITATIONS OF THE TEST: NORMAL
NEGATIVE PREDICTIVE VALUE: NORMAL
PERFORMANCE CHARACTERISTICS: NORMAL
POSITIVE PREDICTIVE VALUE: NORMAL
REF LAB TEST METHOD: NORMAL
SL AMB NOTE:: NORMAL
TEST PERFORMANCE INFO SPEC: NORMAL

## 2025-06-23 PROCEDURE — PNV: Performed by: PHYSICIAN ASSISTANT

## 2025-06-24 LAB
C TRACH RRNA SPEC QL NAA+PROBE: NOT DETECTED
N GONORRHOEA RRNA SPEC QL NAA+PROBE: NOT DETECTED

## 2025-06-26 PROBLEM — O20.9 BLEEDING IN EARLY PREGNANCY: Status: ACTIVE | Noted: 2025-06-26

## 2025-06-26 NOTE — ASSESSMENT & PLAN NOTE
We reviewed the following possible etiologies for bleeding in early pregnancy:   - Urethral/ bladder: Exam confirmed vaginal etiology. No UTI symptoms.  - Bowel: Patient did not have a bowel movement this morning and exam confirmed vaginal etiology  - Vagina: Exam with only old brown blood. No active bleeding on exam. No evidence of vaginal lacerations or excoriations.   - Cervix: Cervix appeared closed. No evidence of contact bleeding. Molecular vaginal panel collected.  - Placenta/ Uterus: No evidence of subchorionic hematoma. Live IUP on ultrasound consistent with LMP.   - Unexplained: At this time, her bleeding remains unexplained but we were able to rule out the causes listed above. She does not currently seem to be having a miscarriage but she was encouraged to continue to monitor for any recurrence of bleeding and keep her MFM appointment on Wednesday.   Ultrasound reviewed in detail and patient able to view images. CBC WNL - no evidence of anemia. She was reassured and comfortable with discharge home at this time. Plan of care discussed with ED docs and patient was discharged.

## 2025-07-14 NOTE — ASSESSMENT & PLAN NOTE
Levothyroxine Increased to 75 mcg by Dr. Vilchis her endocrinologist will be managing her thyroid levels and titrating her meds.

## 2025-07-14 NOTE — ASSESSMENT & PLAN NOTE
Overview:      Labs: UTD, advised to complete AFP   Pap smear:      GC/CT: 25  Prenatal Panel: ordered today   Blood Type: A+ RhoGam not indicated   GBS: plan at 36 weeks     Genetic Testing: NIPS WNL, AFP recommended     Vaccines:  Tdap: will offer at 27 weeks  Flu: offer in season  RSV: Offer in season     Birth Plan: h/o  then 1LTCS due to failed IOL. Desires TOLAC  Yellow packet given and consents signed at 30 weeks.   Feeding Plan: will attempt breast but was unable to w her youngest  Breast pump: TBD  Pediatrician: established   Contraception: TBD  Ultrasounds: 25-normal first trimester US

## 2025-07-14 NOTE — PROGRESS NOTES
OB/GYN  PN Visit  Chikis Amador  [id number]  2025  10:32 AM  JOANNA Scales    S: 39 y.o.  16w5d here for PN visit. Her pregnancy is complicated by listed.    She denies contractions. She denies leakage of fluid and vaginal bleeding.   She denies  edema, and smoking.   Patient feels safe at home.  She reports good fetal movement.    She reports nausea and headaches (tolerable).    O:  Pre-Jennifer Vitals      Flowsheet Row Most Recent Value   Prenatal Assessment    Fetal Heart Rate 155   Prenatal Vitals    Blood Pressure 102/76   Weight - Scale 71.7 kg (158 lb)   Urine Albumin/Glucose    Dilation/Effacement/Station    Vaginal Drainage    Edema           Wt=71.7 kg (158 lb); Body mass index is 27.12 kg/m².; TWG=3.629 kg (8 lb)    General: Well appearing, no distress.  OB exam completed: +FHT.      A/P:    Problem List Items Addressed This Visit       Multigravida of advanced maternal age in first trimester    NIPT WNL         Thyroid disease during pregnancy in first trimester - Primary    Levothyroxine Increased to 75 mcg by Dr. Vilchis her endocrinologist will be managing her thyroid levels and titrating her meds.          Prenatal care, subsequent pregnancy in first trimester    Overview:      Labs: UTD, advised to complete AFP   Pap smear:      GC/CT: 25  Prenatal Panel: ordered today   Blood Type: A+ RhoGam not indicated   GBS: plan at 36 weeks     Genetic Testing: NIPS WNL, AFP recommended     Vaccines:  Tdap: will offer at 27 weeks  Flu: offer in season  RSV: Offer in season     Birth Plan: h/o  then 1LTCS due to failed IOL. Desires TOLAC  Yellow packet given and consents signed at 30 weeks.   Feeding Plan: will attempt breast but was unable to w her youngest  Breast pump: TBD  Pediatrician: established   Contraception: TBD  Ultrasounds: 25-normal first trimester US            Bleeding in early pregnancy       JOANNA Scales  2025  10:32 AM

## 2025-07-16 ENCOUNTER — APPOINTMENT (OUTPATIENT)
Dept: LAB | Facility: CLINIC | Age: 40
End: 2025-07-16
Payer: COMMERCIAL

## 2025-07-16 DIAGNOSIS — Z3A.12 12 WEEKS GESTATION OF PREGNANCY: ICD-10-CM

## 2025-07-16 DIAGNOSIS — Z34.91 PRENATAL CARE, FIRST TRIMESTER: ICD-10-CM

## 2025-07-16 PROCEDURE — 36415 COLL VENOUS BLD VENIPUNCTURE: CPT

## 2025-07-16 PROCEDURE — 82105 ALPHA-FETOPROTEIN SERUM: CPT

## 2025-07-18 LAB
2ND TRIMESTER 4 SCREEN SERPL-IMP: NORMAL
AFP ADJ MOM SERPL: 1.72
AFP INTERP AMN-IMP: NORMAL
AFP INTERP SERPL-IMP: NORMAL
AFP INTERP SERPL-IMP: NORMAL
AFP SERPL-MCNC: 56.6 NG/ML
AGE AT DELIVERY: 41.3 YR
GA METHOD: NORMAL
GA: 16 WEEKS
IDDM PATIENT QL: NO
MULTIPLE PREGNANCY: NO
NEURAL TUBE DEFECT RISK FETUS: 1551 %

## 2025-07-21 ENCOUNTER — ROUTINE PRENATAL (OUTPATIENT)
Dept: OBGYN CLINIC | Facility: CLINIC | Age: 40
End: 2025-07-21

## 2025-07-21 VITALS
SYSTOLIC BLOOD PRESSURE: 102 MMHG | HEIGHT: 64 IN | DIASTOLIC BLOOD PRESSURE: 76 MMHG | BODY MASS INDEX: 26.98 KG/M2 | WEIGHT: 158 LBS

## 2025-07-21 DIAGNOSIS — O20.9 BLEEDING IN EARLY PREGNANCY: ICD-10-CM

## 2025-07-21 DIAGNOSIS — Z34.81 PRENATAL CARE, SUBSEQUENT PREGNANCY IN FIRST TRIMESTER: ICD-10-CM

## 2025-07-21 DIAGNOSIS — O99.281 THYROID DISEASE DURING PREGNANCY IN FIRST TRIMESTER: Primary | ICD-10-CM

## 2025-07-21 DIAGNOSIS — E07.9 THYROID DISEASE DURING PREGNANCY IN FIRST TRIMESTER: Primary | ICD-10-CM

## 2025-07-21 DIAGNOSIS — O09.521 MULTIGRAVIDA OF ADVANCED MATERNAL AGE IN FIRST TRIMESTER: ICD-10-CM

## 2025-07-21 PROCEDURE — PNV

## 2025-08-13 ENCOUNTER — ROUTINE PRENATAL (OUTPATIENT)
Dept: PERINATAL CARE | Facility: CLINIC | Age: 40
End: 2025-08-13
Payer: COMMERCIAL

## 2025-08-13 ENCOUNTER — ANCILLARY PROCEDURE (OUTPATIENT)
Dept: PERINATAL CARE | Facility: CLINIC | Age: 40
End: 2025-08-13
Attending: PHYSICIAN ASSISTANT
Payer: COMMERCIAL

## 2025-08-13 DIAGNOSIS — Z3A.20 20 WEEKS GESTATION OF PREGNANCY: ICD-10-CM

## 2025-08-13 PROCEDURE — 76817 TRANSVAGINAL US OBSTETRIC: CPT | Performed by: OBSTETRICS & GYNECOLOGY

## 2025-08-13 PROCEDURE — 76811 OB US DETAILED SNGL FETUS: CPT | Performed by: OBSTETRICS & GYNECOLOGY

## 2025-08-13 PROCEDURE — NC001 PR NO CHARGE: Performed by: OBSTETRICS & GYNECOLOGY

## 2025-08-15 ENCOUNTER — TELEPHONE (OUTPATIENT)
Dept: PERINATAL CARE | Facility: CLINIC | Age: 40
End: 2025-08-15

## 2025-08-20 ENCOUNTER — TELEPHONE (OUTPATIENT)
Dept: OBGYN CLINIC | Facility: CLINIC | Age: 40
End: 2025-08-20

## (undated) DEVICE — Device

## (undated) DEVICE — CHLORAPREP HI-LITE 26ML ORANGE

## (undated) DEVICE — ABDOMINAL PAD: Brand: DERMACEA

## (undated) DEVICE — PACK C-SECTION PBDS

## (undated) DEVICE — GLOVE INDICATOR PI UNDERGLOVE SZ 7 BLUE

## (undated) DEVICE — SUT VICRYL 0 CT-1 36 IN J946H

## (undated) DEVICE — GLOVE PI ULTRA TOUCH SZ.6.5

## (undated) DEVICE — GAUZE SPONGES,16 PLY: Brand: CURITY

## (undated) DEVICE — SUT VICRYL 2-0 CT-1 27 IN J259H

## (undated) DEVICE — SKIN MARKER DUAL TIP WITH RULER CAP, FLEXIBLE RULER AND LABELS: Brand: DEVON

## (undated) DEVICE — TELFA NON-ADHERENT ABSORBENT DRESSING: Brand: TELFA

## (undated) DEVICE — SUT MONOCRYL 0 CTX 36 IN Y398H